# Patient Record
Sex: FEMALE | Race: WHITE | NOT HISPANIC OR LATINO | Employment: OTHER | URBAN - NONMETROPOLITAN AREA
[De-identification: names, ages, dates, MRNs, and addresses within clinical notes are randomized per-mention and may not be internally consistent; named-entity substitution may affect disease eponyms.]

---

## 2017-01-24 ENCOUNTER — LAB (OUTPATIENT)
Dept: LAB | Facility: OTHER | Age: 62
End: 2017-01-24

## 2017-01-24 ENCOUNTER — TRANSCRIBE ORDERS (OUTPATIENT)
Dept: LAB | Facility: OTHER | Age: 62
End: 2017-01-24

## 2017-01-24 DIAGNOSIS — D05.11 INTRADUCTAL CARCINOMA IN SITU OF RIGHT BREAST: Primary | ICD-10-CM

## 2017-01-24 DIAGNOSIS — D05.11 INTRADUCTAL CARCINOMA IN SITU OF RIGHT BREAST: ICD-10-CM

## 2017-01-24 LAB
ALBUMIN SERPL-MCNC: 4.9 G/DL (ref 3.2–5.5)
ALBUMIN/GLOB SERPL: 2.1 G/DL (ref 1–3)
ALP SERPL-CCNC: 53 U/L (ref 15–121)
ALT SERPL W P-5'-P-CCNC: 29 U/L (ref 10–60)
ANION GAP SERPL CALCULATED.3IONS-SCNC: 12 MMOL/L (ref 5–15)
AST SERPL-CCNC: 26 U/L (ref 10–60)
BASOPHILS # BLD AUTO: 0.04 10*3/MM3 (ref 0–0.2)
BASOPHILS NFR BLD AUTO: 0.5 % (ref 0–2)
BILIRUB SERPL-MCNC: 1.4 MG/DL (ref 0.2–1)
BUN BLD-MCNC: 14 MG/DL (ref 8–25)
BUN/CREAT SERPL: 28 (ref 7–25)
CALCIUM SPEC-SCNC: 10.4 MG/DL (ref 8.4–10.8)
CHLORIDE SERPL-SCNC: 98 MMOL/L (ref 100–112)
CO2 SERPL-SCNC: 24 MMOL/L (ref 20–32)
CREAT BLD-MCNC: 0.5 MG/DL (ref 0.4–1.3)
DEPRECATED RDW RBC AUTO: 41.3 FL (ref 36.4–46.3)
EOSINOPHIL # BLD AUTO: 0.13 10*3/MM3 (ref 0–0.7)
EOSINOPHIL NFR BLD AUTO: 1.8 % (ref 0–7)
ERYTHROCYTE [DISTWIDTH] IN BLOOD BY AUTOMATED COUNT: 13 % (ref 11.5–14.5)
GFR SERPL CREATININE-BSD FRML MDRD: 125 ML/MIN/1.73 (ref 45–104)
GLOBULIN UR ELPH-MCNC: 2.3 GM/DL (ref 2.5–4.6)
GLUCOSE BLD-MCNC: 132 MG/DL (ref 70–100)
HCT VFR BLD AUTO: 40 % (ref 35–45)
HGB BLD-MCNC: 13.8 G/DL (ref 12–15.5)
LYMPHOCYTES # BLD AUTO: 2.84 10*3/MM3 (ref 0.6–4.2)
LYMPHOCYTES NFR BLD AUTO: 38.4 % (ref 10–50)
MCH RBC QN AUTO: 30.9 PG (ref 26.5–34)
MCHC RBC AUTO-ENTMCNC: 34.5 G/DL (ref 31.4–36)
MCV RBC AUTO: 89.7 FL (ref 80–98)
MONOCYTES # BLD AUTO: 0.41 10*3/MM3 (ref 0–0.9)
MONOCYTES NFR BLD AUTO: 5.5 % (ref 0–12)
NEUTROPHILS # BLD AUTO: 3.97 10*3/MM3 (ref 2–8.6)
NEUTROPHILS NFR BLD AUTO: 53.8 % (ref 37–80)
PLATELET # BLD AUTO: 264 10*3/MM3 (ref 150–450)
PMV BLD AUTO: 9.3 FL (ref 8–12)
POTASSIUM BLD-SCNC: 3.8 MMOL/L (ref 3.4–5.4)
PROT SERPL-MCNC: 7.2 G/DL (ref 6.7–8.2)
RBC # BLD AUTO: 4.46 10*6/MM3 (ref 3.77–5.16)
SODIUM BLD-SCNC: 134 MMOL/L (ref 134–146)
WBC NRBC COR # BLD: 7.39 10*3/MM3 (ref 3.2–9.8)

## 2017-01-24 PROCEDURE — 82378 CARCINOEMBRYONIC ANTIGEN: CPT | Performed by: INTERNAL MEDICINE

## 2017-01-24 PROCEDURE — 86300 IMMUNOASSAY TUMOR CA 15-3: CPT | Performed by: INTERNAL MEDICINE

## 2017-01-24 PROCEDURE — 36415 COLL VENOUS BLD VENIPUNCTURE: CPT | Performed by: INTERNAL MEDICINE

## 2017-01-24 PROCEDURE — 85025 COMPLETE CBC W/AUTO DIFF WBC: CPT | Performed by: INTERNAL MEDICINE

## 2017-01-24 PROCEDURE — 80053 COMPREHEN METABOLIC PANEL: CPT | Performed by: INTERNAL MEDICINE

## 2017-01-26 LAB — CANCER AG27-29 SERPL-ACNC: 11 U/ML (ref 0–38.6)

## 2017-01-27 LAB — CEA SERPL-MCNC: 0.6 NG/ML (ref 0–5)

## 2017-06-20 ENCOUNTER — OFFICE VISIT (OUTPATIENT)
Dept: OBSTETRICS AND GYNECOLOGY | Facility: CLINIC | Age: 62
End: 2017-06-20

## 2017-06-20 VITALS
SYSTOLIC BLOOD PRESSURE: 144 MMHG | DIASTOLIC BLOOD PRESSURE: 84 MMHG | BODY MASS INDEX: 30.12 KG/M2 | WEIGHT: 170 LBS | HEIGHT: 63 IN

## 2017-06-20 DIAGNOSIS — Z01.419 WELL FEMALE EXAM WITH ROUTINE GYNECOLOGICAL EXAM: Primary | ICD-10-CM

## 2017-06-20 PROCEDURE — 99396 PREV VISIT EST AGE 40-64: CPT | Performed by: OBSTETRICS & GYNECOLOGY

## 2017-06-21 NOTE — PROGRESS NOTES
Subjective     Chief Complaint   Patient presents with   • Gynecologic Exam     annual       Ryann Tracee Stephen is a 61 y.o.  presents today for an annual exam.  Reports going through menopause many years ago.  No bleeding or discharge since.  No pelvic pain.  Recently had port removed and has been in remission from breast cancer since .  Still followed by oncologist in [place].      Last pap was in  (NIL with neg HRHPV); denies history of abnl paps or STDs.  Last mammogram last year and DEXA scan 2 years ago.      Past Medical History:   Diagnosis Date   • Drug therapy    • Essential hypertension     Since    • History of colonoscopy      Hemorrhoids found. 2013       • History of mammogram 2016    VIVIAN BALDWIN ([facility])   • Hx of radiation therapy    • Hypercholesterolemia    • Malignant neoplasm of breast    • Screening for osteoporosis 2013   • Visit for gynecologic examination      Past Surgical History:   Procedure Laterality Date   • BREAST BIOPSY     • BREAST LUMPECTOMY Left    •  SECTION      primary low transverse  section for cephalopelvic disproportion;  Last Performed:    • CHOLECYSTECTOMY     • TUBAL ABDOMINAL LIGATION      shantell laparoscopic bilateral tubal fulguration, undesired fertility;  Last Performed: 10/20/1994   • WISDOM TOOTH EXTRACTION       wisdom teeth extraction x 4     Social History     Social History   • Marital status:      Spouse name: N/A   • Number of children: N/A   • Years of education: N/A     Occupational History   • Not on file.     Social History Main Topics   • Smoking status: Never Smoker   • Smokeless tobacco: Never Used   • Alcohol use No   • Drug use: Not on file   • Sexual activity: Not on file     Other Topics Concern   • Not on file     Social History Narrative     Family History   Problem Relation Age of Onset   • Coronary artery disease Father    • Diabetes Father    • Stroke Father    •  "Hypertension Mother    • Pancreatic cancer Mother        Review of Systems - comprehensive ROS preformed and all negative.     Objective      /84  Ht 63\" (160 cm)  Wt 170 lb (77.1 kg)  LMP  (LMP Unknown)  BMI 30.11 kg/m2    Physical Exam  General:   Appears stated age, AAOx3, NAD   Heart: RRR no m/r/g   Lungs: CTAB   Breast: Left breast is smaller than right breast, radiation changes noted on left breast being that tissue is more dense than right breast.  No masses or lumps noted bilaterally. No nipple discharge.    Neck: No neck fullness, thyroid normal with no nodules noted   Abdomen: Soft, nttp, no masses palpated   Pelvis: External genitalia - NEFG  Urethra - normal appearing, no urethra caruncle or prolapse  Vagina - moderate amount of vaginal atrophy, no discharge or blood noted on glove at end of bimanual exam.   Cervix - No CMT  Uterus - 6 week sized small uterus   Adenxa - no adnexal fullness or masses noted  Anus - normal appearing, no hemorroids   Extremities: No CT or edema bilaterally    Neurologic: CN II - XII grossly intact       Assessment/Plan     ASSESSMENT  1. Well female exam with routine gynecological exam        PLAN  1. Well woman exam  - No pap today given last pap in 2014 NIL with negative co-testing  - Reviewed self breast exams, mammogram today  - DEXA scan today  - Colonoscopy up to date per patient  - RTC in 1 year for annual exam.     Orders Placed This Encounter   Procedures   • DEXA Bone Density Axial   • Mammo screening digital tomosynthesis bilateral w REYNALDO MADRIGAL FriMD sammi  6/20/2017           "

## 2017-07-03 RX ORDER — ATORVASTATIN CALCIUM 80 MG/1
TABLET, FILM COATED ORAL
Qty: 90 TABLET | Refills: 3 | Status: SHIPPED | OUTPATIENT
Start: 2017-07-03

## 2017-07-28 ENCOUNTER — TRANSCRIBE ORDERS (OUTPATIENT)
Dept: LAB | Facility: OTHER | Age: 62
End: 2017-07-28

## 2017-07-28 DIAGNOSIS — D05.90 CARCINOMA IN SITU OF BREAST, UNSPECIFIED LATERALITY: Primary | ICD-10-CM

## 2018-01-03 RX ORDER — ALENDRONATE SODIUM 70 MG/1
70 TABLET ORAL
Qty: 12 TABLET | Refills: 12 | Status: SHIPPED | OUTPATIENT
Start: 2018-01-03 | End: 2020-11-18 | Stop reason: ALTCHOICE

## 2018-06-22 DIAGNOSIS — Z12.31 ENCOUNTER FOR SCREENING MAMMOGRAM FOR MALIGNANT NEOPLASM OF BREAST: Primary | ICD-10-CM

## 2018-07-02 ENCOUNTER — TELEPHONE (OUTPATIENT)
Dept: ONCOLOGY | Facility: HOSPITAL | Age: 63
End: 2018-07-02

## 2018-07-06 ENCOUNTER — OFFICE VISIT (OUTPATIENT)
Dept: SURGERY | Facility: CLINIC | Age: 63
End: 2018-07-06

## 2018-07-06 VITALS
HEIGHT: 63 IN | SYSTOLIC BLOOD PRESSURE: 138 MMHG | BODY MASS INDEX: 28.17 KG/M2 | WEIGHT: 159 LBS | DIASTOLIC BLOOD PRESSURE: 80 MMHG

## 2018-07-06 DIAGNOSIS — R92.8 ABNORMAL MAMMOGRAM: Primary | ICD-10-CM

## 2018-07-06 PROCEDURE — 99204 OFFICE O/P NEW MOD 45 MIN: CPT | Performed by: SURGERY

## 2018-07-06 RX ORDER — FENOFIBRATE 54 MG/1
TABLET ORAL
COMMUNITY
Start: 2018-03-12 | End: 2019-03-12 | Stop reason: DRUGHIGH

## 2018-07-06 NOTE — PROGRESS NOTES
62-year-old female referred here after she underwent a screening mammogram and subsequent diagnostic mammogram and ultrasound of left breast demonstrated to concerning areas on the medial aspect of her left breast.  One is at 9:00 and the other was at 9:30 position in the appear to be separate hypoechoic concerning lesions by ultrasound.  One lesion he can see on mammogram.  Radiology read these as BI-RADS 5.  Patient's had breast cancer in her left breast and she's had breast conserving therapy with her surgery done over in [place] a few years ago.  She received chemotherapy and hormonal therapy as well.  Denies any complaints related to either breast.  No family history of breast cancer.  I have reviewed the mammograms and agree with radiology that these 2 lesions should be biopsied and are concerning.    Allergies: No Known Allergies      Home Medications:  Prior to Admission medications    Medication Sig Start Date End Date Taking? Authorizing Provider   alendronate (FOSAMAX) 70 MG tablet Take 1 tablet by mouth Every 7 (Seven) Days. 1/3/18  Yes Spring HERRON Friday, MD   atorvastatin (LIPITOR) 80 MG tablet TAKE 1 TABLET DAILY 7/3/17  Yes Ector Rodriguez MD   fenofibrate (TRICOR) 54 MG tablet TAKE 1 TABLET DAILY 3/12/18  Yes Historical Provider, MD   hydrochlorothiazide (HYDRODIURIL) 25 MG tablet Take 25 mg by mouth daily.   Yes Historical Provider, MD   lisinopril (PRINIVIL,ZESTRIL) 20 MG tablet TAKE 1 TABLET DAILY 8/2/16  Yes Ector Rodriguez MD   metFORMIN (GLUCOPHAGE) 1000 MG tablet Take 1,000 mg by mouth. 12/20/17 12/21/18 Yes Historical Provider, MD       Social History     Social History   • Marital status:      Spouse name: N/A   • Number of children: N/A   • Years of education: N/A     Occupational History   • Not on file.     Social History Main Topics   • Smoking status: Never Smoker   • Smokeless tobacco: Never Used   • Alcohol use No   • Drug use: No   • Sexual activity: Not on file     Other  Topics Concern   • Not on file     Social History Narrative   • No narrative on file       Past Medical History:   Diagnosis Date   • Drug therapy    • Essential hypertension     Since    • History of colonoscopy      Hemorrhoids found. 2013       • History of mammogram 2016    VIVIAN BALDWIN ([facility])   • Hx of radiation therapy    • Hypercholesterolemia    • Malignant neoplasm of breast (CMS/HCC)    • Screening for osteoporosis 2013   • Visit for gynecologic examination        Family History   Problem Relation Age of Onset   • Coronary artery disease Father    • Diabetes Father    • Stroke Father    • Hypertension Mother    • Pancreatic cancer Mother        Past Surgical History:   Procedure Laterality Date   • BREAST BIOPSY     • BREAST LUMPECTOMY Left    •  SECTION      primary low transverse  section for cephalopelvic disproportion;  Last Performed:    • CHOLECYSTECTOMY     • TUBAL ABDOMINAL LIGATION      shantell laparoscopic bilateral tubal fulguration, undesired fertility;  Last Performed: 10/20/1994   • WISDOM TOOTH EXTRACTION       wisdom teeth extraction x 4     Ros  Denies chest pain   denies shortness of breath  Denies any bleeding issue  Does not take blood thinners      Physical exam  Alert and appropriate  Nontoxic appearing  Neck soft without masses  Lungs clear  Heart regular rate and rhythm  Breasts symmetrical without any palpable masses nipple discharge or adenopathy  Skin warm and dry  Abdomen soft      Assessment and plan  2 concerning lesions in left breast with a history of left breast cancer.  Recommend ultrasound guided mammotome biopsy of both these lesions and leaving a clip.  I fully discussed the procedure alternatives risk and benefits with the patient and her  and they understand and wish to proceed

## 2018-07-06 NOTE — PATIENT INSTRUCTIONS

## 2018-07-09 ENCOUNTER — HOSPITAL ENCOUNTER (OUTPATIENT)
Dept: ULTRASOUND IMAGING | Facility: HOSPITAL | Age: 63
Discharge: HOME | End: 2018-07-09
Admitting: SURGERY

## 2018-07-09 ENCOUNTER — HOSPITAL ENCOUNTER (OUTPATIENT)
Dept: ULTRASOUND IMAGING | Facility: HOSPITAL | Age: 63
Discharge: HOME | End: 2018-07-09

## 2018-07-09 VITALS
WEIGHT: 158.51 LBS | HEART RATE: 75 BPM | BODY MASS INDEX: 28.09 KG/M2 | RESPIRATION RATE: 18 BRPM | DIASTOLIC BLOOD PRESSURE: 71 MMHG | SYSTOLIC BLOOD PRESSURE: 148 MMHG | TEMPERATURE: 97.2 F | OXYGEN SATURATION: 97 % | HEIGHT: 63 IN

## 2018-07-09 VITALS
HEART RATE: 72 BPM | SYSTOLIC BLOOD PRESSURE: 118 MMHG | TEMPERATURE: 97.2 F | DIASTOLIC BLOOD PRESSURE: 56 MMHG | RESPIRATION RATE: 18 BRPM | OXYGEN SATURATION: 95 %

## 2018-07-09 DIAGNOSIS — R92.8 ABNORMAL MAMMOGRAM: ICD-10-CM

## 2018-07-09 PROCEDURE — 19084 BX BREAST ADD LESION US IMAG: CPT | Performed by: SURGERY

## 2018-07-09 PROCEDURE — 88341 IMHCHEM/IMCYTCHM EA ADD ANTB: CPT | Performed by: PATHOLOGY

## 2018-07-09 PROCEDURE — 88305 TISSUE EXAM BY PATHOLOGIST: CPT | Performed by: PATHOLOGY

## 2018-07-09 PROCEDURE — 88305 TISSUE EXAM BY PATHOLOGIST: CPT | Performed by: SURGERY

## 2018-07-09 PROCEDURE — 88342 IMHCHEM/IMCYTCHM 1ST ANTB: CPT | Performed by: SURGERY

## 2018-07-09 PROCEDURE — A4648 IMPLANTABLE TISSUE MARKER: HCPCS

## 2018-07-09 PROCEDURE — 19083 BX BREAST 1ST LESION US IMAG: CPT | Performed by: SURGERY

## 2018-07-09 PROCEDURE — 88341 IMHCHEM/IMCYTCHM EA ADD ANTB: CPT | Performed by: SURGERY

## 2018-07-09 PROCEDURE — 88342 IMHCHEM/IMCYTCHM 1ST ANTB: CPT | Performed by: PATHOLOGY

## 2018-07-09 RX ORDER — LIDOCAINE HYDROCHLORIDE AND EPINEPHRINE 10; 10 MG/ML; UG/ML
20 INJECTION, SOLUTION INFILTRATION; PERINEURAL ONCE
Status: COMPLETED | OUTPATIENT
Start: 2018-07-09 | End: 2018-07-09

## 2018-07-09 RX ORDER — IBUPROFEN 600 MG/1
600 TABLET ORAL EVERY 6 HOURS PRN
Status: DISCONTINUED | OUTPATIENT
Start: 2018-07-09 | End: 2018-07-10 | Stop reason: HOSPADM

## 2018-07-09 RX ORDER — DIAZEPAM 5 MG/1
5 TABLET ORAL ONCE
Status: COMPLETED | OUTPATIENT
Start: 2018-07-09 | End: 2018-07-09

## 2018-07-09 RX ORDER — HYDROCODONE BITARTRATE AND ACETAMINOPHEN 5; 325 MG/1; MG/1
1 TABLET ORAL ONCE AS NEEDED
Status: DISCONTINUED | OUTPATIENT
Start: 2018-07-09 | End: 2018-07-10 | Stop reason: HOSPADM

## 2018-07-09 RX ORDER — SODIUM CHLORIDE 9 MG/ML
50 INJECTION, SOLUTION INTRAVENOUS CONTINUOUS
Status: DISCONTINUED | OUTPATIENT
Start: 2018-07-09 | End: 2018-07-10 | Stop reason: HOSPADM

## 2018-07-09 RX ADMIN — DIAZEPAM 5 MG: 5 TABLET ORAL at 09:21

## 2018-07-09 RX ADMIN — LIDOCAINE HYDROCHLORIDE,EPINEPHRINE BITARTRATE 20 ML: 10; .01 INJECTION, SOLUTION INFILTRATION; PERINEURAL at 10:10

## 2018-07-09 RX ADMIN — SODIUM CHLORIDE 50 ML/HR: 900 INJECTION, SOLUTION INTRAVENOUS at 10:10

## 2018-07-09 NOTE — DISCHARGE INSTRUCTIONS
Care after Local Anesthesia    You have remained awake during your surgery. The medicine you received was injected directly into the surgery site. The medicine numbed the area so you didn't feel any pain during surgery. Depending on the medicine used, you may feel comfortable for several hours.    Activity:    1) Use caution to protect your extremity from injury until the numbness is gone. You may return to your normal home activities as directed by your health care provider.    2) You are at an increased risk for falling related to the anesthesia and/or sedation during surgery and pain medication you will take at home. You will need assistance with ambulation until the feelings in your extremity returns to normal. Utilize assist devices (example: crutches, walker, cane or a care provider/family member as needed).    3) You must not drive a car or operate equipment for at least 24 hours unless exempted by the attending physician. If you are dizzy for longer than 24 hours, notify your physician.    Medicine/Discomfort:    You can expect some discomfort when the local anesthetic wears off, if your health care provider ordered pain medicine, take it as prescribed.    Diet:    You may resume your normal diet. Do not drink alcoholic beverages for at least 24 hours following anesthesia and/or sedation.    When to call your health care provider:    Call your health care provider if you have any questions or concerns.

## 2018-07-09 NOTE — H&P (VIEW-ONLY)
62-year-old female referred here after she underwent a screening mammogram and subsequent diagnostic mammogram and ultrasound of left breast demonstrated to concerning areas on the medial aspect of her left breast.  One is at 9:00 and the other was at 9:30 position in the appear to be separate hypoechoic concerning lesions by ultrasound.  One lesion he can see on mammogram.  Radiology read these as BI-RADS 5.  Patient's had breast cancer in her left breast and she's had breast conserving therapy with her surgery done over in [place] a few years ago.  She received chemotherapy and hormonal therapy as well.  Denies any complaints related to either breast.  No family history of breast cancer.  I have reviewed the mammograms and agree with radiology that these 2 lesions should be biopsied and are concerning.    Allergies: No Known Allergies      Home Medications:  Prior to Admission medications    Medication Sig Start Date End Date Taking? Authorizing Provider   alendronate (FOSAMAX) 70 MG tablet Take 1 tablet by mouth Every 7 (Seven) Days. 1/3/18  Yes Spring HERRON Friday, MD   atorvastatin (LIPITOR) 80 MG tablet TAKE 1 TABLET DAILY 7/3/17  Yes Ector Rodriguez MD   fenofibrate (TRICOR) 54 MG tablet TAKE 1 TABLET DAILY 3/12/18  Yes Historical Provider, MD   hydrochlorothiazide (HYDRODIURIL) 25 MG tablet Take 25 mg by mouth daily.   Yes Historical Provider, MD   lisinopril (PRINIVIL,ZESTRIL) 20 MG tablet TAKE 1 TABLET DAILY 8/2/16  Yes Ector Rodriguez MD   metFORMIN (GLUCOPHAGE) 1000 MG tablet Take 1,000 mg by mouth. 12/20/17 12/21/18 Yes Historical Provider, MD       Social History     Social History   • Marital status:      Spouse name: N/A   • Number of children: N/A   • Years of education: N/A     Occupational History   • Not on file.     Social History Main Topics   • Smoking status: Never Smoker   • Smokeless tobacco: Never Used   • Alcohol use No   • Drug use: No   • Sexual activity: Not on file     Other  Topics Concern   • Not on file     Social History Narrative   • No narrative on file       Past Medical History:   Diagnosis Date   • Drug therapy    • Essential hypertension     Since    • History of colonoscopy      Hemorrhoids found. 2013       • History of mammogram 2016    VIVIAN BALDWIN ([facility])   • Hx of radiation therapy    • Hypercholesterolemia    • Malignant neoplasm of breast (CMS/HCC)    • Screening for osteoporosis 2013   • Visit for gynecologic examination        Family History   Problem Relation Age of Onset   • Coronary artery disease Father    • Diabetes Father    • Stroke Father    • Hypertension Mother    • Pancreatic cancer Mother        Past Surgical History:   Procedure Laterality Date   • BREAST BIOPSY     • BREAST LUMPECTOMY Left    •  SECTION      primary low transverse  section for cephalopelvic disproportion;  Last Performed:    • CHOLECYSTECTOMY     • TUBAL ABDOMINAL LIGATION      shantell laparoscopic bilateral tubal fulguration, undesired fertility;  Last Performed: 10/20/1994   • WISDOM TOOTH EXTRACTION       wisdom teeth extraction x 4     Ros  Denies chest pain   denies shortness of breath  Denies any bleeding issue  Does not take blood thinners      Physical exam  Alert and appropriate  Nontoxic appearing  Neck soft without masses  Lungs clear  Heart regular rate and rhythm  Breasts symmetrical without any palpable masses nipple discharge or adenopathy  Skin warm and dry  Abdomen soft      Assessment and plan  2 concerning lesions in left breast with a history of left breast cancer.  Recommend ultrasound guided mammotome biopsy of both these lesions and leaving a clip.  I fully discussed the procedure alternatives risk and benefits with the patient and her  and they understand and wish to proceed

## 2018-07-11 ENCOUNTER — OFFICE VISIT (OUTPATIENT)
Dept: SURGERY | Facility: CLINIC | Age: 63
End: 2018-07-11

## 2018-07-11 VITALS
SYSTOLIC BLOOD PRESSURE: 188 MMHG | DIASTOLIC BLOOD PRESSURE: 80 MMHG | BODY MASS INDEX: 28.53 KG/M2 | WEIGHT: 161 LBS | HEIGHT: 63 IN

## 2018-07-11 DIAGNOSIS — M79.89 FAT NECROSIS: Primary | ICD-10-CM

## 2018-07-11 LAB
LAB AP CASE REPORT: NORMAL
LAB AP SPECIAL STAINS: NORMAL
PATH REPORT.FINAL DX SPEC: NORMAL
PATH REPORT.GROSS SPEC: NORMAL

## 2018-07-11 PROCEDURE — 99212 OFFICE O/P EST SF 10 MIN: CPT | Performed by: SURGERY

## 2018-07-11 NOTE — PATIENT INSTRUCTIONS

## 2018-07-11 NOTE — PROGRESS NOTES
62-year-old female returns to days after left breast ultrasound guided mammotome biopsy of 2 felt to be separate lesions in left breast.  I spoke with Dr. holden and he feels both lesions are fat necrosis.  Patient did well with the procedure and has no complaints.  I went over the pathology with both patient and her .  I recommend she get a mammogram of the left breast in 3 months return to clinic after the study or sooner if she has any other concerns or questions.

## 2018-07-18 DIAGNOSIS — R92.8 ABNORMAL MAMMOGRAM OF LEFT BREAST: Primary | ICD-10-CM

## 2019-03-12 ENCOUNTER — PROCEDURE VISIT (OUTPATIENT)
Dept: OBSTETRICS AND GYNECOLOGY | Facility: CLINIC | Age: 64
End: 2019-03-12

## 2019-03-12 VITALS
DIASTOLIC BLOOD PRESSURE: 75 MMHG | BODY MASS INDEX: 28.35 KG/M2 | HEIGHT: 63 IN | SYSTOLIC BLOOD PRESSURE: 142 MMHG | WEIGHT: 160 LBS

## 2019-03-12 DIAGNOSIS — Z01.419 ENCOUNTER FOR WELL WOMAN EXAM WITH ROUTINE GYNECOLOGICAL EXAM: Primary | ICD-10-CM

## 2019-03-12 DIAGNOSIS — Z12.31 ENCOUNTER FOR SCREENING MAMMOGRAM FOR BREAST CANCER: ICD-10-CM

## 2019-03-12 PROCEDURE — 99396 PREV VISIT EST AGE 40-64: CPT | Performed by: NURSE PRACTITIONER

## 2019-03-12 PROCEDURE — 87624 HPV HI-RISK TYP POOLED RSLT: CPT | Performed by: NURSE PRACTITIONER

## 2019-03-12 PROCEDURE — G0123 SCREEN CERV/VAG THIN LAYER: HCPCS | Performed by: NURSE PRACTITIONER

## 2019-03-12 RX ORDER — SPIRONOLACTONE 50 MG/1
1 TABLET, FILM COATED ORAL 2 TIMES DAILY
COMMUNITY
Start: 2018-12-26

## 2019-03-12 RX ORDER — FENOFIBRATE 160 MG/1
160 TABLET ORAL
COMMUNITY
Start: 2019-03-05 | End: 2019-10-16

## 2019-03-12 NOTE — PROGRESS NOTES
Subjective   Ryann Tracee Stephen is a 63 y.o. Here for GYN exam. No complaints or concerns at this time.    LMP- ; denies any PMB.     Hx of BRCA with left lumpectomy, chemotherapy and radiation. Had an abnormal screening mammogram of the left breast in 2018 but the biopsy was negative and her f/u in October was normal.       Gynecologic Exam   The patient's pertinent negatives include no genital itching, genital lesions, genital odor, genital rash, missed menses, pelvic pain, vaginal bleeding or vaginal discharge. Pertinent negatives include no abdominal pain, constipation, diarrhea or dysuria. She is sexually active. No, her partner does not have an STD. She uses tubal ligation for contraception. She is postmenopausal. Her past medical history is significant for an abdominal surgery and a gynecological surgery. There is no history of a  section, an ectopic pregnancy, endometriosis, herpes simplex, menorrhagia, metrorrhagia, miscarriage, ovarian cysts, perineal abscess, PID, an STD, a terminated pregnancy or vaginosis.       The following portions of the patient's history were reviewed and updated as appropriate: allergies, current medications, past family history, past medical history, past social history, past surgical history and problem list.    Review of Systems   Constitutional: Negative for activity change, appetite change, diaphoresis, fatigue, unexpected weight gain and unexpected weight loss.   Respiratory: Negative for chest tightness and shortness of breath.    Cardiovascular: Negative for chest pain and palpitations.   Gastrointestinal: Negative for abdominal distention, abdominal pain, constipation and diarrhea.   Genitourinary: Negative for breast discharge, decreased libido, dyspareunia, dysuria, menorrhagia, missed menses, pelvic pain, vaginal bleeding, vaginal discharge, vaginal pain, breast lump and breast pain.   Musculoskeletal: Negative for myalgias.   Skin: Negative for  color change, dry skin and skin lesions.   Neurological: Negative for light-headedness and headache.   Psychiatric/Behavioral: Negative for agitation, dysphoric mood, sleep disturbance, depressed mood and stress. The patient is not nervous/anxious.        Objective   Physical Exam   Constitutional: She is oriented to person, place, and time. She appears well-developed and well-nourished.   Neck: No thyromegaly present.   Cardiovascular: Normal rate, regular rhythm, normal heart sounds and intact distal pulses.   Pulmonary/Chest: Effort normal and breath sounds normal. Right breast exhibits no inverted nipple, no mass, no nipple discharge, no skin change and no tenderness. Left breast exhibits no inverted nipple, no mass, no nipple discharge, no skin change and no tenderness. Breasts are symmetrical.   Abdominal: Soft. Bowel sounds are normal. She exhibits no distension. There is no tenderness.   Genitourinary: Vagina normal and uterus normal. No breast discharge or bleeding. There is no rash, tenderness, lesion or injury on the right labia. There is no rash, tenderness, lesion or injury on the left labia. Cervix exhibits no motion tenderness, no discharge and no friability. Right adnexum displays no mass, no tenderness and no fullness. Left adnexum displays no mass, no tenderness and no fullness.   Genitourinary Comments: Vaginal atrophy noted. Pap smear obtained.    Lymphadenopathy:     She has no axillary adenopathy.        Right: No inguinal adenopathy present.        Left: No inguinal adenopathy present.   Neurological: She is alert and oriented to person, place, and time.   Skin: Skin is warm, dry and intact.   Psychiatric: She has a normal mood and affect. Her speech is normal and behavior is normal.   Nursing note and vitals reviewed.        Assessment/Plan   Ryann was seen today for gynecologic exam.    Diagnoses and all orders for this visit:    Encounter for well woman exam with routine gynecological  exam  -     Liquid-based Pap Smear, Screening    Encounter for screening mammogram for breast cancer  -     Mammo Screening Digital Tomosynthesis Bilateral With CAD          RTC in 1-2 years for GYN exam or sooner, PRN.

## 2019-03-14 LAB
GEN CATEG CVX/VAG CYTO-IMP: NORMAL
LAB AP CASE REPORT: NORMAL
LAB AP GYN ADDITIONAL INFORMATION: NORMAL
PATH INTERP SPEC-IMP: NORMAL
STAT OF ADQ CVX/VAG CYTO-IMP: NORMAL

## 2019-03-19 LAB — HPV I/H RISK 4 DNA CVX QL PROBE+SIG AMP: NEGATIVE

## 2019-06-18 DIAGNOSIS — Z13.820 OSTEOPOROSIS SCREENING: Primary | ICD-10-CM

## 2019-06-18 DIAGNOSIS — Z78.0 POSTMENOPAUSAL: ICD-10-CM

## 2019-09-04 DIAGNOSIS — Z17.0 MALIGNANT NEOPLASM OF OVERLAPPING SITES OF LEFT BREAST IN FEMALE, ESTROGEN RECEPTOR POSITIVE (HCC): Primary | ICD-10-CM

## 2019-09-04 DIAGNOSIS — C50.812 MALIGNANT NEOPLASM OF OVERLAPPING SITES OF LEFT BREAST IN FEMALE, ESTROGEN RECEPTOR POSITIVE (HCC): Primary | ICD-10-CM

## 2019-10-16 ENCOUNTER — OFFICE VISIT (OUTPATIENT)
Dept: ONCOLOGY | Facility: CLINIC | Age: 64
End: 2019-10-16

## 2019-10-16 VITALS
HEART RATE: 72 BPM | DIASTOLIC BLOOD PRESSURE: 72 MMHG | SYSTOLIC BLOOD PRESSURE: 136 MMHG | RESPIRATION RATE: 16 BRPM | BODY MASS INDEX: 28.14 KG/M2 | HEIGHT: 63 IN | OXYGEN SATURATION: 99 % | TEMPERATURE: 97.3 F | WEIGHT: 158.8 LBS

## 2019-10-16 DIAGNOSIS — I10 ESSENTIAL HYPERTENSION: ICD-10-CM

## 2019-10-16 DIAGNOSIS — C50.212 MALIGNANT NEOPLASM OF UPPER-INNER QUADRANT OF LEFT BREAST IN FEMALE, ESTROGEN RECEPTOR POSITIVE (HCC): Primary | ICD-10-CM

## 2019-10-16 DIAGNOSIS — M81.8 OTHER OSTEOPOROSIS, UNSPECIFIED PATHOLOGICAL FRACTURE PRESENCE: ICD-10-CM

## 2019-10-16 DIAGNOSIS — E78.00 HYPERCHOLESTEROLEMIA: ICD-10-CM

## 2019-10-16 DIAGNOSIS — Z17.0 MALIGNANT NEOPLASM OF UPPER-INNER QUADRANT OF LEFT BREAST IN FEMALE, ESTROGEN RECEPTOR POSITIVE (HCC): Primary | ICD-10-CM

## 2019-10-16 PROCEDURE — 99214 OFFICE O/P EST MOD 30 MIN: CPT | Performed by: NURSE PRACTITIONER

## 2019-10-16 RX ORDER — PHENOL 1.4 %
600 AEROSOL, SPRAY (ML) MUCOUS MEMBRANE DAILY
COMMUNITY

## 2019-10-16 RX ORDER — LOSARTAN POTASSIUM 50 MG/1
50 TABLET ORAL DAILY
COMMUNITY

## 2019-10-17 ENCOUNTER — RESULTS ENCOUNTER (OUTPATIENT)
Dept: ONCOLOGY | Facility: CLINIC | Age: 64
End: 2019-10-17

## 2019-10-17 DIAGNOSIS — C50.212 MALIGNANT NEOPLASM OF UPPER-INNER QUADRANT OF LEFT BREAST IN FEMALE, ESTROGEN RECEPTOR POSITIVE (HCC): ICD-10-CM

## 2019-10-17 DIAGNOSIS — Z17.0 MALIGNANT NEOPLASM OF UPPER-INNER QUADRANT OF LEFT BREAST IN FEMALE, ESTROGEN RECEPTOR POSITIVE (HCC): ICD-10-CM

## 2019-10-17 NOTE — PROGRESS NOTES
[facility]  HEMATOLOGY & ONCOLOGY    [facility] ONC [facility] HEMATOLOGY AND ONCOLOGY  [address]  [facility] [address] [phone]    Patient Name: Ryann Stephen  Encounter Date: 10/16/2019  YOB: 1955  Patient Number: [id number]    Subjective     VISIT DIAGNOSIS:   Encounter Diagnoses   Name Primary?   • Malignant neoplasm of upper-inner quadrant of left breast in female, estrogen receptor positive (CMS/HCC) Yes   • Other osteoporosis, unspecified pathological fracture presence    • Essential hypertension    • Hypercholesterolemia        REASON FOR VISIT:     Chief Complaint   Patient presents with   • Breast Cancer     Here for yearly f/u. Had mammo and bone density in July. Not having any c/o today.        Problem List Items Addressed This Visit        Cardiovascular and Mediastinum    Essential hypertension    Relevant Medications    losartan (COZAAR) 50 MG tablet    Hypercholesterolemia       Musculoskeletal and Integument    Osteoporosis       Other    Malignant neoplasm of upper-inner quadrant of left breast in female, estrogen receptor positive (CMS/HCC) - Primary    Relevant Orders    CEA    CA 27.29 (Serial Monitor)    CA 27.29 (Serial Monitor)    CEA    CBC & Differential    Comprehensive Metabolic Panel          Cancer Staging Information:  Cancer Staging  Malignant neoplasm of upper-inner quadrant of left breast in female, estrogen receptor positive (CMS/HCC)  Staging form: Breast, AJCC V7  - Pathologic stage from 10/16/2019: Stage IB (T1b, N1mi, cM0) - Signed by Justine Shaver APRN on 10/16/2019      Oncology/Hematology History    DIAGNOSTIC ABNORMALITIES:  Mammography on 12/28/2010 revealed fibroglandular densities in the breast parenchyma, numerous benign-appearing calcifications in each breast, and a questionable area of increasing nodular density in the outer middle third of the left breast.  This area was associated with microcalcification and was an indeterminate finding.  Further evaluation was advised.  Cone-down spot compression views on 01/11/2011 revealed a spiculated lesion in the left breast strongly suspicious for carcinoma.  Biopsy was advised.  Stereotactic mammotome biopsy at [facility] in [place], [place] on 01/14/2011 showed moderately differentiated invasive ductal carcinoma with ductal carcinoma in situ. The findings are compatible with a primary breast cancer. The malignancy was characterized further as 99% ER positive, 60% ID positive, and HER-2/robert of +1.  Left breast needle localization biopsy (excisional) and node accession, 02/14/2011. She was found to have residual infiltrating ductal carcinoma, high grade. She was found to have a 1 x 0.7 x 0.7 cm high grade invasive ductal carcinoma, Frankfort score = 8. Also noted were calcifications and benign breast elements, healing prior biopsy site, and no satellite lesions. One of the two accessed lymph nodes was positive with the largest deposit measuring 0.75 mm. Extranodal extension was identified. The tumor was staged as an AJCC pT1b, N1mic, MX, G3.  Further staging studies at [facility] on 03/08/2011 included a negative chest CT, brain CT, and nuclear bone scan.  The multinodular goiter is noted.  PREVIOUS INTERVENTIONS:  Left breast needle localization biopsy (excisional) and node accession, 02/14/2011.  T/C (Taxotere and Cytoxan).  From 03/10/2011 through 05/11/2011. Four cycles completed.  Adjuvant Arimidex. From 05/30/2011 through 05/30/2016  XRT to the left breast. From 06/21/2011 through 08/01/2011.          Malignant neoplasm of upper-inner quadrant of left breast in female, estrogen receptor positive (CMS/HCC)       INTERVAL HISTORY  Patient ID: Ryann Stephen is a 64 y.o. year old female who is seen in follow-up for carcinoma of the left breast.  She was diagnosed with a stage Ib  left breast cancer in .  She underwent lumpectomy in 2011 followed by adjuvant chemotherapy, adjuvant radiation therapy to the breast and also completed 5 years of adjuvant hormonal manipulation with Arimidex.  She completed the hormonal manipulation in May 2016.  The patient is here alone today.  History is obtained from the patient who is considered to be a reliable historian.    She presents today feeling well.  She has no complaints.  She has no pain.  No abdominal pain, nausea, vomiting nor diarrhea.  She had no fever chills or night sweats and no weight changes.  She denies any bowel or bladder habit changes.      She continues to follow with her primary care provider Dr. Pastrana.  She is up-to-date on her bone mineral density study as it was just completed on 2019.  Her mammogram was also 2019 and unremarkable.  She continues on Fosamax.  Her last colonoscopy was  and unremarkable.    Lab work on 10/9/2019 shows a white count of 7.8, hemoglobin 13.9, hematocrit 42.1 and a platelet count of 346,000.  Her CMP was normal except for a minor elevation in glucose at 131.  She was not fasting.    Past Medical History:   Past Medical History:   Diagnosis Date   • Drug therapy    • Essential hypertension     Since    • History of colonoscopy      Hemorrhoids found. 2013       • History of mammogram 2016    VIVIAN BALDWIN ([facility])   • Hx of radiation therapy    • Hypercholesterolemia    • Malignant neoplasm of breast (CMS/HCC)    • Screening for osteoporosis 2013   • Visit for gynecologic examination      Past Surgical History:   Past Surgical History:   Procedure Laterality Date   • BREAST BIOPSY     • BREAST LUMPECTOMY Left    •  SECTION      primary low transverse  section for cephalopelvic disproportion;  Last Performed:    • CHOLECYSTECTOMY     • TUBAL ABDOMINAL LIGATION      shantell laparoscopic bilateral tubal fulguration, undesired  fertility;  Last Performed: 10/20/1994   • WISDOM TOOTH EXTRACTION       wisdom teeth extraction x 4     Social History:   Social History     Socioeconomic History   • Marital status:      Spouse name: Not on file   • Number of children: Not on file   • Years of education: Not on file   • Highest education level: Not on file   Tobacco Use   • Smoking status: Never Smoker   • Smokeless tobacco: Never Used   Substance and Sexual Activity   • Alcohol use: No   • Drug use: No   • Sexual activity: Defer     Family History:   Family History   Problem Relation Age of Onset   • Coronary artery disease Father    • Stroke Father    • Hypertension Mother    • Pancreatic cancer Mother    • Diabetes Mother    • Breast cancer Maternal Aunt    • Cancer Maternal Grandmother         female   • No Known Problems Maternal Grandfather    • Liver cancer Paternal Grandmother    • No Known Problems Paternal Grandfather        Review of Systems   Constitutional: Negative for activity change, appetite change, chills, diaphoresis, fatigue, fever and unexpected weight change.   HENT: Negative for congestion, facial swelling, mouth sores, nosebleeds, sore throat, trouble swallowing and voice change.    Eyes: Negative for discharge and redness.   Respiratory: Negative for cough, chest tightness, shortness of breath and wheezing.    Cardiovascular: Negative for chest pain, palpitations and leg swelling.   Gastrointestinal: Negative for abdominal distention, abdominal pain, blood in stool, constipation, diarrhea, nausea and vomiting.   Endocrine: Negative.    Genitourinary: Negative for difficulty urinating, dysuria, frequency, hematuria and urgency.   Musculoskeletal: Negative for arthralgias, gait problem and myalgias.   Skin: Negative for color change and rash.   Neurological: Negative for dizziness, weakness, light-headedness, numbness and headaches.   Hematological: Negative for adenopathy. Does not bruise/bleed easily.  "  Psychiatric/Behavioral: Negative for confusion and sleep disturbance. The patient is not nervous/anxious.         Medications:    Current Outpatient Medications   Medication Sig Dispense Refill   • alendronate (FOSAMAX) 70 MG tablet Take 1 tablet by mouth Every 7 (Seven) Days. 12 tablet 12   • atorvastatin (LIPITOR) 80 MG tablet TAKE 1 TABLET DAILY 90 tablet 3   • calcium carbonate (OS-HO) 600 MG tablet Take 600 mg by mouth Daily.     • Dapagliflozin Propanediol (FARXIGA) 10 MG tablet Take  by mouth.     • Lactobacillus (PROBIOTIC ACIDOPHILUS PO) Take  by mouth.     • losartan (COZAAR) 50 MG tablet Take 50 mg by mouth Daily.     • Misc. Devices (ILLUSIONS C BREAST PROSTHESIS) misc New breast prosthesis for the left breast 1 each 0   • spironolactone (ALDACTONE) 50 MG tablet Take 1 tablet by mouth 2 (Two) Times a Day.       No current facility-administered medications for this visit.        ALLERGIES:  No Known Allergies    Objective      Vitals:    10/16/19 1519   BP: 136/72   Pulse: 72   Resp: 16   Temp: 97.3 °F (36.3 °C)   TempSrc: Temporal   SpO2: 99%   Weight: 72 kg (158 lb 12.8 oz)   Height: 160 cm (63\")   PainSc: 0-No pain     /72   Pulse 72   Temp 97.3 °F (36.3 °C) (Temporal)   Resp 16   Ht 160 cm (63\")   Wt 72 kg (158 lb 12.8 oz)   LMP  (LMP Unknown)   SpO2 99%   Breastfeeding? No   BMI 28.13 kg/m²      No flowsheet data found.    PHYSICAL EXAM:  General Appearance:    Alert, cooperative, well nourished in no distress   Head:    Normocephalic, without obvious abnormality, atraumatic   Eyes:    PERRL, conjunctiva pink, sclera clear, EOM's intact   Ears:    Not examined   Nose:   Nares normal, septum midline, mucosa normal, no drainage    Throat:   Lips, mucosa, and tongue normal;mucous membranes moist   Neck:   Supple, Trachea midline       Lungs:     Clear to auscultation bilaterally, respirations unlabored   Chest Wall:    No tenderness or deformity; Breasts: left breast smaller than right " with surgical changes, no palpable abnormalities in either breast.       Heart:    Regular rate and rhythm, no murmur, rub or gallop   Abdomen:     Soft, non-tender, bowel sounds active all four quadrants,     no masses, no organomegaly   Extremities:   Extremities without cyanosis or edema       Skin:   Skin color, texture, turgor normal, no rashes or lesions   Lymph nodes:   Cervical, supraclavicular, and axillary nodes normal   Neurologic:   Grossly nonfocal, gait coordinated and smooth, cognition is   preserved.     LABS    Lab Results - Last 18 Months   Lab Units 10/09/19  0827 12/28/18  0836 08/23/18  0738   HEMOGLOBIN g/dL 13.9 13.1 12.4*   HEMATOCRIT % 42.1 39.2 37.1*   MCV fL 92 91 93   WBC 10*3/uL 7.8 5.3 5.9   RDW fL 42.3 40.5 42.2   MPV fL 9.8 9.5 10.4   PLATELETS 10*3/uL 346 316 289   IMM GRAN % % 57.6 51.6 49.6   NRBC % 0 0 0       Lab Results - Last 18 Months   Lab Units 12/28/18  0836   SODIUM mmol/L 139   POTASSIUM mmol/L 3.8   CHLORIDE mmol/L 103   CREATININE mg/dL 0.8   BUN mg/dL 16   CALCIUM mg/dL 8.8   ALK PHOS U/L 45*   TOTAL PROTEIN g/dL 6.3*   ALT (SGPT) U/L 21*   AST (SGOT) U/L 21   BILIRUBIN mg/dL 0.5   ALBUMIN g/dL 3.8       Lab Results - Last 18 Months   Lab Units 08/23/18  0736   CEA ng/mL 0.81       Lab Results - Last 18 Months   Lab Units 10/23/18  1609 08/23/18  0738   IRON ug/dL 72  --    TSH u[iU]/mL  --  2.03         Assessment/Plan     Patient Active Problem List   Diagnosis   • Essential hypertension   • Osteoporosis   • History of breast cancer   • Visit for gynecologic examination   • Screening for osteoporosis   • Malignant neoplasm of upper-inner quadrant of left breast in female, estrogen receptor positive (CMS/HCC)   • Hypercholesterolemia   • History of mammogram   • History of colonoscopy   • Abnormal mammogram   • Fat necrosis        1. Malignant neoplasm of upper-inner quadrant of left breast in female, estrogen receptor positive (CMS/HCC)  Carcinoma of the left breast,  moderately differentiated infiltrating ductal carcinoma and ductal carcinoma in situ (DCIS) diagnosed in 2011 with the following profile:   Tumor size 1 x 0.7 x 0.7 cm.   Baseline Stage:  AJCC Stage IB (pT1b N1mic M0 G2).   Receptor Status: ER 99%, ME 60%, HER-2/robert at +1.   Baseline Node Status:  1 of 2 positive, mo.    Tumor status: OLIVIA.  Mammogram performed on 7/1/2019 negative.     Mrs. Stephen is doing very well continuing to show no signs of recurrence of her disease.  Her mammogram is up-to-date and unremarkable.  She is asymptomatic and labs are stable.  Her markers are remaining within normal range.  We will continue to follow.    2. Other osteoporosis, unspecified pathological fracture presence  Recent bone mineral density study took place On July 1, 2019 that showed mild improvement and this is being managed by Dr. Pastrana.  She is on Fosamax and tolerating it well per his management.    3. Essential hypertension  Blood pressure today is stable at 136/72 with a pulse of 72.  She was advised to continue following with primary care provider for management    4. Hypercholesterolemia  Patient continues on Lipitor for cholesterol.  She was encouraged to continue the same and again follow with Dr. Pastrana.       PLAN  1. Continue yearly surveillance  2. Encouraged patient to continue to follow with PCP and other specialists  3. Obtain pre-office lab CBC with differential, CMP, CEA and CA27-29.  At [facility] before visit.    4.  Return to the [place] office 12 months for followup.    Orders Placed This Encounter   Procedures   • CEA     Standing Status:   Future   • CA 27.29 (Serial Monitor)     Standing Status:   Future   • CA 27.29 (Serial Monitor)     Standing Status:   Future   • CEA     Standing Status:   Future   • Comprehensive Metabolic Panel     Standing Status:   Future   • CBC & Differential     Standing Status:   Future     Standing Expiration Date:   10/15/2020     Order  Specific Question:   Manual Differential     Answer:   No         I have spent a total of  _26___  minutes in face-to-face encounter with the patient, out of which more than 50% was counseling the patient and family regarding coordination of care.  Counseling included but not limited to time spent reviewing labs, treatment plan as well as answering questions.     All activities occurring within this visit are in accordance with the plan of care as set forth by Dr Edward Barber.    Justine Shaver, TUSHAR    10/16/2019    7:35 PM

## 2019-10-21 ENCOUNTER — RESULTS ENCOUNTER (OUTPATIENT)
Dept: ONCOLOGY | Facility: CLINIC | Age: 64
End: 2019-10-21

## 2019-10-21 DIAGNOSIS — C50.212 MALIGNANT NEOPLASM OF UPPER-INNER QUADRANT OF LEFT BREAST IN FEMALE, ESTROGEN RECEPTOR POSITIVE (HCC): ICD-10-CM

## 2019-10-21 DIAGNOSIS — Z17.0 MALIGNANT NEOPLASM OF UPPER-INNER QUADRANT OF LEFT BREAST IN FEMALE, ESTROGEN RECEPTOR POSITIVE (HCC): ICD-10-CM

## 2020-06-16 ENCOUNTER — TELEPHONE (OUTPATIENT)
Dept: OBSTETRICS AND GYNECOLOGY | Facility: CLINIC | Age: 65
End: 2020-06-16

## 2020-06-17 DIAGNOSIS — Z12.31 ENCOUNTER FOR SCREENING MAMMOGRAM FOR MALIGNANT NEOPLASM OF BREAST: Primary | ICD-10-CM

## 2020-09-02 ENCOUNTER — TELEPHONE (OUTPATIENT)
Dept: ONCOLOGY | Facility: CLINIC | Age: 65
End: 2020-09-02

## 2020-10-16 ENCOUNTER — RESULTS ENCOUNTER (OUTPATIENT)
Dept: ONCOLOGY | Facility: CLINIC | Age: 65
End: 2020-10-16

## 2020-10-16 DIAGNOSIS — C50.212 MALIGNANT NEOPLASM OF UPPER-INNER QUADRANT OF LEFT BREAST IN FEMALE, ESTROGEN RECEPTOR POSITIVE (HCC): ICD-10-CM

## 2020-10-16 DIAGNOSIS — Z17.0 MALIGNANT NEOPLASM OF UPPER-INNER QUADRANT OF LEFT BREAST IN FEMALE, ESTROGEN RECEPTOR POSITIVE (HCC): ICD-10-CM

## 2020-11-04 DIAGNOSIS — Z17.0 MALIGNANT NEOPLASM OF UPPER-INNER QUADRANT OF LEFT BREAST IN FEMALE, ESTROGEN RECEPTOR POSITIVE (HCC): Primary | ICD-10-CM

## 2020-11-04 DIAGNOSIS — C50.212 MALIGNANT NEOPLASM OF UPPER-INNER QUADRANT OF LEFT BREAST IN FEMALE, ESTROGEN RECEPTOR POSITIVE (HCC): Primary | ICD-10-CM

## 2020-11-09 ENCOUNTER — RESULTS ENCOUNTER (OUTPATIENT)
Dept: ONCOLOGY | Facility: CLINIC | Age: 65
End: 2020-11-09

## 2020-11-09 DIAGNOSIS — Z17.0 MALIGNANT NEOPLASM OF UPPER-INNER QUADRANT OF LEFT BREAST IN FEMALE, ESTROGEN RECEPTOR POSITIVE (HCC): ICD-10-CM

## 2020-11-09 DIAGNOSIS — C50.212 MALIGNANT NEOPLASM OF UPPER-INNER QUADRANT OF LEFT BREAST IN FEMALE, ESTROGEN RECEPTOR POSITIVE (HCC): ICD-10-CM

## 2020-11-16 NOTE — PROGRESS NOTES
[facility] HEMATOLOGY AND ONCOLOGY  [address]  [place] [address] [phone]    Patient Name: Ryann Stephen  Encounter Date: 11/18/2020  YOB: 1955  Patient Number: [id number]      REASON FOR VISIT:  Ms. Ryann Stephen is a 65-year-old female who is seen in follow-up for carcinoma of the left breast. She is seen 117.5 months post diagnosis and 54 months since cessation of adjuvant Arimidex (completed 60 months of therapy). The patient is here with her spouse, Miguel. History is obtained from the patient who is considered to be a reliable historian.     DIAGNOSTIC ABNORMALITIES:   1. Mammography on 12/28/2010 revealed fibroglandular densities in the breast parenchyma, numerous benign-appearing calcifications in each breast, and a questionable area of increasing nodular density in the outer middle third of the left breast. This area was associated with microcalcification and was an indeterminate finding. Further evaluation was advised.  2. Cone-down spot compression views on 01/11/2011 revealed a spiculated lesion in the left breast strongly suspicious for carcinoma. Biopsy was advised.  3. Stereotactic mammotome biopsy at [facility] in [place], [place] on 01/14/2011 showed moderately differentiated invasive ductal carcinoma with ductal carcinoma in situ. The findings are compatible with a primary breast cancer. The malignancy was characterized further as 99% ER positive, 60% LA positive, and HER-2/robert of +1.  4. Left breast needle localization biopsy (excisional) and node accession, 02/14/2011. She was found to have residual infiltrating ductal carcinoma, high grade. She was found to have a 1 x 0.7 x 0.7 cm high grade invasive ductal carcinoma, Joya score = 8. Also noted were calcifications and benign breast elements, healing prior biopsy site, and no satellite lesions. One of the two  accessed lymph nodes was positive with the largest deposit measuring 0.75 mm. Extranodal extension was identified. The tumor was staged as an AJCC pT1b, N1mic, MX, G3.  5. Further staging studies at [facility] on 03/08/2011 included a negative chest CT, brain CT, and nuclear bone scan. The multinodular goiter is noted.    PREVIOUS INTERVENTIONS:   1. Left breast needle localization biopsy (excisional) and node accession, 02/14/2011.  2. T/C (Taxotere and Cytoxan). From 03/10/2011 through 05/11/2011. Four cycles completed.  3. Adjuvant Arimidex. From 05/30/2011 through 05/30/2016  4. XRT to the left breast. From 06/21/2011 through 08/01/2011.        Problem List Items Addressed This Visit        Other    Malignant neoplasm of upper-inner quadrant of left breast in female, estrogen receptor positive (CMS/HCC) - Primary        Oncology/Hematology History Overview Note   DIAGNOSTIC ABNORMALITIES:  Mammography on 12/28/2010 revealed fibroglandular densities in the breast parenchyma, numerous benign-appearing calcifications in each breast, and a questionable area of increasing nodular density in the outer middle third of the left breast. This area was associated with microcalcification and was an indeterminate finding.  Further evaluation was advised.  Cone-down spot compression views on 01/11/2011 revealed a spiculated lesion in the left breast strongly suspicious for carcinoma.  Biopsy was advised.  Stereotactic mammotome biopsy at [facility] in [place], [place] on 01/14/2011 showed moderately differentiated invasive ductal carcinoma with ductal carcinoma in situ. The findings are compatible with a primary breast cancer. The malignancy was characterized further as 99% ER positive, 60% CO positive, and HER-2/robert of +1.  Left breast needle localization biopsy (excisional) and node accession, 02/14/2011. She was found to have residual infiltrating ductal carcinoma, high grade. She was found  to have a 1 x 0.7 x 0.7 cm high grade invasive ductal carcinoma, [place] score = 8. Also noted were calcifications and benign breast elements, healing prior biopsy site, and no satellite lesions. One of the two accessed lymph nodes was positive with the largest deposit measuring 0.75 mm. Extranodal extension was identified. The tumor was staged as an AJCC pT1b, N1mic, MX, G3.  Further staging studies at [facility] on 03/08/2011 included a negative chest CT, brain CT, and nuclear bone scan.  The multinodular goiter is noted.  PREVIOUS INTERVENTIONS:  Left breast needle localization biopsy (excisional) and node accession, 02/14/2011.  T/C (Taxotere and Cytoxan).  From 03/10/2011 through 05/11/2011. Four cycles completed.  Adjuvant Arimidex. From 05/30/2011 through 05/30/2016  XRT to the left breast. From 06/21/2011 through 08/01/2011.       Malignant neoplasm of upper-inner quadrant of left breast in female, estrogen receptor positive (CMS/HCC)       PAST MEDICAL HISTORY:  ALLERGIES:  No Known Allergies  CURRENT MEDICATIONS:  Outpatient Encounter Medications as of 11/18/2020   Medication Sig Dispense Refill   • Dulaglutide (Trulicity) 0.75 MG/0.5ML solution pen-injector Inject 0.75 mg under the skin into the appropriate area as directed.     • fenofibrate 160 MG tablet Take 160 mg by mouth Daily.     • levothyroxine (SYNTHROID, LEVOTHROID) 50 MCG tablet Take 50 mcg by mouth.     • metFORMIN (GLUCOPHAGE) 1000 MG tablet Take 1,000 mg by mouth.     • atorvastatin (LIPITOR) 80 MG tablet TAKE 1 TABLET DAILY 90 tablet 3   • calcium carbonate (OS-HO) 600 MG tablet Take 600 mg by mouth Daily.     • Dapagliflozin Propanediol (FARXIGA) 10 MG tablet Take  by mouth.     • Lactobacillus (PROBIOTIC ACIDOPHILUS PO) Take  by mouth.     • losartan (COZAAR) 50 MG tablet Take 50 mg by mouth Daily.     • Misc. Devices (ILLUSIONS C BREAST PROSTHESIS) [facility] New breast prosthesis for the left breast 1 each 0   •  spironolactone (ALDACTONE) 50 MG tablet Take 1 tablet by mouth 2 (Two) Times a Day.     • [DISCONTINUED] alendronate (FOSAMAX) 70 MG tablet Take 1 tablet by mouth Every 7 (Seven) Days. 12 tablet 12     No facility-administered encounter medications on file as of 2020.    ADULT ILLNESSES:   Carcinoma of breast ( Stage Date: Unknown, Stage IB (T1b, pN1mi, M0)-Pathological  Date of Dx: ER Status: Positive; CT Status: Positive; HER-2/robert Status: Positive; Menopausal Status: Postmenopausal; Histopathologic Type: DCIS; Laterality: Left; ER-%: 99; CT-%: 60; HER-2/robert Value-IHC: 1+; Grade 2; ICD-10:D05.12 ;Intraductal carcinoma in situ of left breast )   Infiltrating ductal Ca of breast ( ICD-10:C50.512 ;Malignant neoplasm of lower-outer quadrant of left female breast   Diabetes mellitus type II ( type 2; ICD-10:E11.9 ;Type 2 diabetes mellitus without complications )   Glucose intolerance ( ICD-10:E74.39 ;Other disorders of intestinal carbohydrate absorption   Goiter ( ICD-10:E04.9 ;Nontoxic goiter, unspecified   Hyperlipidemia ( ICD-10:E78.5 ;Hyperlipidemia, unspecified   Hypertension ( ICD-10:I10 ;Essential (primary) hypertension   Hyponatremia ( ICD-10:E87.1 ;Hypo-osmolality and hyponatremia   Irritable bowel ( ICD-10:K58.9 ;Irritable bowel syndrome without diarrhea   Osteoporosis ( ICD-10:M81.0 ;Age-related osteoporosis without current pathological fracture  SURGERIES:    section, ()   Cholecystectomy   Extraction of impacted wisdom tooth   Tubal ligation    ADULT ILLNESSES:  Patient Active Problem List   Diagnosis Code   • Essential hypertension I10   • Osteoporosis M81.0   • History of breast cancer Z85.3   • Visit for gynecologic examination Z01.419   • Screening for osteoporosis Z13.820   • Malignant neoplasm of upper-inner quadrant of left breast in female, estrogen receptor positive (CMS/HCC) C50.212, Z17.0   • Hypercholesterolemia E78.00   • History of mammogram Z92.89   • History of  colonoscopy Z98.890   • Abnormal mammogram R92.8   • Fat necrosis M79.89     SURGERIES:  Past Surgical History:   Procedure Laterality Date   • BREAST BIOPSY     • BREAST LUMPECTOMY Left    •  SECTION      primary low transverse  section for cephalopelvic disproportion;  Last Performed:    • CHOLECYSTECTOMY     • TUBAL ABDOMINAL LIGATION      shantell laparoscopic bilateral tubal fulguration, undesired fertility;  Last Performed: 10/20/1994   • WISDOM TOOTH EXTRACTION       wisdom teeth extraction x 4     HEALTH MAINTENANCE ITEMS:  Health Maintenance Due   Topic Date Due   • COLONOSCOPY  1955   • TDAP/TD VACCINES (1 - Tdap) 1973   • ZOSTER VACCINE (1 of 2) 2004   • HEPATITIS C SCREENING  2016   • ANNUAL WELLNESS VISIT  2016   • DIABETIC FOOT EXAM  2016   • DIABETIC EYE EXAM  2016   • Pneumococcal Vaccine 65+ (1 of 1 - PPSV23) 2019   • INFLUENZA VACCINE  2020       <no information>  Last Completed Colonoscopy       Status Date      COLONOSCOPY No completions recorded          There is no immunization history on file for this patient.  Last Completed Mammogram       Status Date      MAMMOGRAM Done 2020 MAMMO SCREENING DIGITAL TOMOSYNTHESIS BILATERAL W CAD     Patient has more history with this topic...            FAMILY HISTORY:  Family History   Problem Relation Age of Onset   • Coronary artery disease Father    • Stroke Father    • Hypertension Mother    • Pancreatic cancer Mother    • Diabetes Mother    • Breast cancer Maternal Aunt    • Cancer Maternal Grandmother         female   • No Known Problems Maternal Grandfather    • Liver cancer Paternal Grandmother    • No Known Problems Paternal Grandfather      SOCIAL HISTORY:  Social History     Socioeconomic History   • Marital status:      Spouse name: Not on file   • Number of children: Not on file   • Years of education: Not on file   • Highest education level: Not on file  "  Tobacco Use   • Smoking status: Never Smoker   • Smokeless tobacco: Never Used   Substance and Sexual Activity   • Alcohol use: No   • Drug use: No   • Sexual activity: Defer       REVIEW OF SYSTEMS:  Performance Status:   ECOG 0.  Constitutional:   The patient's appetite and energy are good. She remains active. Says she still walks 3 miles/at least 5 days a week. She has regained 1 pound (had lost 3 pounds at her prior visit) since her last visit.  She has no fevers, chills, or drenching night sweats. Her sleep habits seem appropriate.  Ear/Nose/Mouth/Throat:   She reports no ear pains, sinus symptoms, sore throat, nosebleeds, or sore tongue. She reports no headaches. She reports no hoarseness, change in voice quality, or hemoptysis.   Ocular:   She reports no eye pain, significant change in visual acuity, double vision, or blurry vision.\"My prescription hasn't changed.\"  Respiratory:   She reports no chronic cough, significant shortness of breathing, phlegm production, or unexplained chest wall pain.  Cardiovascular:   She reports no exertional chest pain, chest pressure, or chest heaviness. She reports no claudication. She reports no palpitations or symptomatic orthostasis.  Gastrointestinal:   She reports no dysphagia, nausea, vomiting, postprandial abdominal pain, bloating, cramping, change in bowel habits, or discoloration of the stool. She reports no rectal bleeding. She reports no constipation or diarrhea.  Genitourinary:   She reports no recent UTIs.  No urinary burning, frequency, dribbling, or discoloration. She reports no need to urinate frequently through the night. She reports no difficulty controlling her bladder.  GYN:   She reports no unexplained vaginal bleeding and no significant vaginal discharge. Menopause age 44.  Breasts:   She reports no breast pain, nipple discharge, or bleeding. She reports no palpable breast masses. BSE is ongoing and unrevealing.  Musculoskeletal:   She has not had any " "arthralgias of the ankles and knees since she has been walking daily. She has no myalgias or nighttime leg cramping.  Extremities:   She reports no trouble with fluid retention or significant leg swelling.  Endocrine:   She reports no problems with excess thirst, excessive urination, vasomotor instability, or unexplained fatigue.  Heme/Lymphatic:   She reports no unexplained bleeding, bruising, petechial rash, or swollen glands.  Skin:   She reports no itching, rashes, or lesions which won't heal.  Neuro:   She reports no loss of consciousness, seizures, fainting spells, or dizziness. She reports no weakness of her face, arms, or legs. She reports no difficulty with speech. She has no tremors. She has no paresthesias.  Psych:   She reports no depression nor anxiety.         VITAL SIGNS: /64   Pulse 75   Temp 97.8 °F (36.6 °C)   Resp 16   Ht 160 cm (63\")   Wt 72.3 kg (159 lb 4.8 oz)   LMP  (LMP Unknown)   SpO2 98%   Breastfeeding No   BMI 28.22 kg/m² Body surface area is 1.76 meters squared.  Pain Score    11/18/20 1525   PainSc: 0-No pain         PHYSICAL EXAMINATION:   General Exam:   She is a pleasant, cooperative, , well-developed, well-nourished, and modestly-kept female who is comfortable at rest. She arrived in the exam room ambulatory. She appears to be her stated age. Her skin color is normal. ECOG 0  Head/Neck:   The patient is anicteric and atraumatic. The trachea is midline. The neck is supple without evidence of jugular venous distention or cervical adenopathy.  Eyes:   The pupils are equal, round, and reactive to light. The extraocular movements are full. There is no scleral jaundice or erythema.  Chest:   The respiratory efforts are normal and unhindered. The chest is clear to auscultation. There are no wheezes, rhonchi, rales, or asymmetry of breath sounds. The port in the right upper chest is well seated.  Cardiovascular:   The patient has a regular cardiac rate and rhythm without " murmurs, rubs, or gallops. The peripheral pulses are equal and full.  Breast:   The breasts are dense. The right breast is devoid of masses or axillary adenopathy. Inspection of the left lumpectomy site reveals no erythema or worrisome nodularity to suggest tumor recurrence.  Extremities:   There is no evidence of cyanosis, clubbing, or edema.  Rheumatologic:   There is no overt evidence of rheumatoid deformities of the hands. There is no sausaging of the fingers. There is no sign of active synovitis. The gait is normal.  Cutaneous:   There are no overt rashes, disseminated lesions, purpura, or petechiae.  Lymphatics:   There is no evidence of adenopathy in the cervical, supraclavicular, or axillary areas.  Neurologic:   The patient is alert, oriented, cooperative, and pleasant. She is appropriately conversant. She ambulated into the exam room without assistance and transferred from chair to exam table unaided. There is no overt dysfunction of the motor, sensory, or cerebellar systems.  Psych:   Mood and affect are appropriate for circumstance. Eye contact is appropriate.        LABS    Lab Results - Last 18 Months   Lab Units 12/04/19  0857 10/09/19  0827   HEMOGLOBIN g/dL 14.4 13.9   HEMATOCRIT % 43.1 42.1   MCV fL 92 92   WBC 10*3/uL 6.5 7.8   RDW fL 41 42.3   MPV fL 9.3 9.8   PLATELETS 10*3/uL 335 346   IMM GRAN % % 56.7 57.6   NRBC % 0 0       No results for input(s): GLUCOSE, NA, K, CO2, CL, ANIONGAP, CREATININE, BUN, BCR, CALCIUM, EGFRIFNONA, ALKPHOS, PROTEINTOT, ALT, AST, BILITOT, ALBUMIN, GLOB in the last [id number] hours.    Invalid input(s): LABIL    Lab Results - Last 18 Months   Lab Units 11/10/20  0901 10/22/19  1315   CEA ng/mL 1.11 0.88     ASSESSMENT:   1. Carcinoma of the left breast, moderately differentiated infiltrating ductal carcinoma and ductal carcinoma in situ (DCIS). Tumor size 1 x 0.7 x 0.7 cm.   Baseline Stage: AJCC Stage IB (pT1b N1mic M0 G2).   Receptor Status: ER 99%, NH 60%, HER-2/robert at  +1.   Baseline Node Status: 1 of 2 positive, mo.   Menopausal Status at Diagnosis: Postmenopausal (1999).   Tumor status: Mammogram performed on 06/28/2018 at [facility]. Increasing architectural distortion in the medial aspect of the left breast associated with multiple oil cysts, possibly due to previous surgery but does not correlate with the placement of the scar marker which was present on the lateral portion of the left breast on the exams from 2012 and 2013. Review of the mammographic report from 12/28/2010 states that the original mass was in the slightly outer, middle one third of the left breast. Recommend spot compression views, 90 degree true lateral view, and ultrasound for further evaluation. Please correlate with the patient regarding a possible surgical intervention at this site.   Left unilateral diagnostic mammogram with 3-D tomosynthesis with CAD and ultrasound left breast performed on 07/03/2018 at [facility]. Left breast 9 to 10:00 position and 9:00 position mass lesions as described above which are highly suggestive of malignancy. Biopsy is recommended to further evaluate   Surgical pathology performed on 07/09/2018 at [facility]. Left breast, 9 o'clock position, mammotome biopsy: fat necrosis with microcalcification. No evidence of neoplasm. 2. Left breast, 9:30 position, mammotome biopsy: fat necrosis with microcalcification. No evidence of neoplasm.  07/03/2020-mammogram.  No mammographic evidence of malignancy.  BI-RADS Category 2: Benign findings.  Recommendation: Annual mammography.  2. Osteoporosis. On weekly Fosamax (with calcium + vitamin D) by PCP -  Friday.  a. Bone density, 06/09/2015 [place], [place]: CONCLUSION. Bilateral femoral neck osteopenia, fracture risk increased. Lumbar spine osteoporosis. Fracture risk high.   b. Bone Density performed on 06/20/2017 at [facility] was revealing for L1-L4 bone mineral density (BMD): 0.856 grams per square  centimeter 1.7 standard deviations (T score) below the mean compared to a young normal. T score is -1.7. Mean of bilateral femoral necks, bone mineral density: 0.695 grams per square centimeter 1.4 standard deviations (T score) below the mean compared to a young normal. Lumbar spine: Osteopenia. Bilateral femoral neck: Osteopenia. T score is -1.4. IMPRESSION: 1. Bone mineral density measurements as above. 2. Lumbar spine: Osteopenia. 3. Bilateral femoral neck: Osteopenia.   3. Hypertension. On Losartan and aldactone  4. Hyperlipidemia. On Lipitor  5. Possible irritable bowel. Quiescent.   6. Hypothyroidism.  Synthroid  7. Diabetes. Now on metformin and Trulicity.   8. Early rheumatoid arthritis, 2013. Quiescent.   9. Self-directed - refuses BCI and missed appointment last 03/2017.     PLAN:   1. Apprised of labs from 11/02/2020 and 11/10/2020. Stable (normal) CBC, mild hyperglycemia, normal bilirubin with otherwise stable CMP, and normal tumor markers (CA 27-29 = 11.7; from 11.4; CEA = 1.1; from 0.81).   2. Fax labs from 11/02/2020 to PCP.   3.  Re: The mammogram, 7/30/2020.  OLIVIA  4. Previously discussed the possible benefit of extending therapy with AIs for another 5 years (MA. 17R study showing 4% increased DFS), noting the possible downsides of therapy (11% new onset osteoporosis, 14% bone fractures). Again discussed Breast Cancer Index study but ultimately patient refused.  5. Continue current medications.   6. Continue ongoing management per primary care physician and other specialists.  7. Mammograms per  Friday - patient says they schedule - repeat scheduled for 10/2018 prior to the annual study 06/2019.  8. Obtain pre-office lab CBC with differential, CMP, CEA and CA27-29. At [facility] or [facility] week before visit.   9. Return to the [place] office 12 months.    MEDICAL DECISION MAKING: Moderate Complexity   AMOUNT OF DATA: Moderate   TIME SPENT: Face-to-face time on this  encounter, as defined by the     I spent 27 total minutes, face-to-face, caring for Ryann today.  Greater than 50% of this time involved counseling and/or coordination of care as documented within this note regarding the patient's illness(es), pros and cons of various treatment options, instructions and/or risk reduction.    cc: Aries Pastrana MD ([facility])        Spring Godinez MD ([facility])

## 2020-11-18 ENCOUNTER — OFFICE VISIT (OUTPATIENT)
Dept: ONCOLOGY | Facility: CLINIC | Age: 65
End: 2020-11-18

## 2020-11-18 VITALS
HEIGHT: 63 IN | WEIGHT: 159.3 LBS | BODY MASS INDEX: 28.23 KG/M2 | RESPIRATION RATE: 16 BRPM | SYSTOLIC BLOOD PRESSURE: 118 MMHG | TEMPERATURE: 97.8 F | OXYGEN SATURATION: 98 % | DIASTOLIC BLOOD PRESSURE: 64 MMHG | HEART RATE: 75 BPM

## 2020-11-18 DIAGNOSIS — Z17.0 MALIGNANT NEOPLASM OF UPPER-INNER QUADRANT OF LEFT BREAST IN FEMALE, ESTROGEN RECEPTOR POSITIVE (HCC): Primary | ICD-10-CM

## 2020-11-18 DIAGNOSIS — C50.212 MALIGNANT NEOPLASM OF UPPER-INNER QUADRANT OF LEFT BREAST IN FEMALE, ESTROGEN RECEPTOR POSITIVE (HCC): Primary | ICD-10-CM

## 2020-11-18 PROCEDURE — 99214 OFFICE O/P EST MOD 30 MIN: CPT | Performed by: INTERNAL MEDICINE

## 2020-11-18 RX ORDER — FENOFIBRATE 160 MG/1
160 TABLET ORAL DAILY
COMMUNITY
Start: 2020-08-19

## 2020-11-18 RX ORDER — LEVOTHYROXINE SODIUM 0.05 MG/1
50 TABLET ORAL
COMMUNITY
Start: 2020-08-19

## 2020-11-18 RX ORDER — DULAGLUTIDE 0.75 MG/.5ML
0.75 INJECTION, SOLUTION SUBCUTANEOUS
COMMUNITY
Start: 2020-11-06

## 2021-06-28 ENCOUNTER — TELEPHONE (OUTPATIENT)
Dept: OBSTETRICS AND GYNECOLOGY | Facility: CLINIC | Age: 66
End: 2021-06-28

## 2021-06-28 DIAGNOSIS — Z12.31 ENCOUNTER FOR SCREENING MAMMOGRAM FOR MALIGNANT NEOPLASM OF BREAST: ICD-10-CM

## 2021-06-28 DIAGNOSIS — Z13.820 OSTEOPOROSIS SCREENING: Primary | ICD-10-CM

## 2021-06-28 DIAGNOSIS — M85.852 OSTEOPENIA OF BOTH HIPS: ICD-10-CM

## 2021-06-28 DIAGNOSIS — M85.851 OSTEOPENIA OF BOTH HIPS: ICD-10-CM

## 2021-06-28 NOTE — TELEPHONE ENCOUNTER
Pt called requesting to schedule a mammogram and BD. Pt is already scheduled.    Can you please put orders in?    Thanks.

## 2021-07-14 ENCOUNTER — TELEPHONE (OUTPATIENT)
Dept: OBSTETRICS AND GYNECOLOGY | Facility: CLINIC | Age: 66
End: 2021-07-14

## 2021-07-14 NOTE — TELEPHONE ENCOUNTER
----- Message from TUSHAR Oh sent at 7/14/2021 10:47 AM CDT -----  Osteopenia on DEXA; continue Os-Julian supplement and increase weight bearing exercises. Next due in 2 years

## 2021-11-09 ENCOUNTER — TELEPHONE (OUTPATIENT)
Dept: ONCOLOGY | Facility: CLINIC | Age: 66
End: 2021-11-09

## 2021-11-09 NOTE — TELEPHONE ENCOUNTER
Provider: CONCEPCION   Caller: BETO MERCER  Relationship to Patient: SELF    Phone Number: [phone]  Reason for Call: PT NEEDS [facility] ORD[facility]  FAX# [phone]  PT HAS APT ON Monday 11/22/2021.

## 2021-11-17 NOTE — PROGRESS NOTES
MGW ONC [facility] GROUP HEMATOLOGY AND ONCOLOGY  [address]  [facility] [address] [phone]    Patient Name: Ryann Stephen  Encounter Date: 11/22/2021  YOB: 1955  Patient Number: [id number]      REASON FOR VISIT:  Ms. Ryann Stephen is a 66-year-old female who is seen in follow-up for carcinoma of the left breast. She is seen 129.5 months post diagnosis and 66 months since cessation of adjuvant Arimidex (completed 60 months of therapy). The patient is here alone (previously with her spouse, Miguel).      I have reviewed the HPI and verified with the patient the accuracy of it. No changes to interval history since the information was documented. Edward Barber MD 11/22/21     DIAGNOSTIC ABNORMALITIES:   1. Mammography on 12/28/2010 revealed fibroglandular densities in the breast parenchyma, numerous benign-appearing calcifications in each breast, and a questionable area of increasing nodular density in the outer middle third of the left breast. This area was associated with microcalcification and was an indeterminate finding. Further evaluation was advised.  2. Cone-down spot compression views on 01/11/2011 revealed a spiculated lesion in the left breast strongly suspicious for carcinoma. Biopsy was advised.  3. Stereotactic mammotome biopsy at [facility] in [place], [place] on 01/14/2011 showed moderately differentiated invasive ductal carcinoma with ductal carcinoma in situ. The findings are compatible with a primary breast cancer. The malignancy was characterized further as 99% ER positive, 60% MO positive, and HER-2/robert of +1.  4. Left breast needle localization biopsy (excisional) and node accession, 02/14/2011. She was found to have residual infiltrating ductal carcinoma, high grade. She was found to have a 1 x 0.7 x 0.7 cm high grade invasive ductal carcinoma, Longmont score = 8. Also noted were  calcifications and benign breast elements, healing prior biopsy site, and no satellite lesions. One of the two accessed lymph nodes was positive with the largest deposit measuring 0.75 mm. Extranodal extension was identified. The tumor was staged as an AJCC pT1b, N1mic, MX, G3.  5. Further staging studies at [facility] on 03/08/2011 included a negative chest CT, brain CT, and nuclear bone scan. The multinodular goiter is noted.    PREVIOUS INTERVENTIONS:   1. Left breast needle localization biopsy (excisional) and node accession, 02/14/2011.  2. T/C (Taxotere and Cytoxan). From 03/10/2011 through 05/11/2011. Four cycles completed.  3. Adjuvant Arimidex. From 05/30/2011 through 05/30/2016  4. XRT to the left breast. From 06/21/2011 through 08/01/2011.        Problem List Items Addressed This Visit        Other    Malignant neoplasm of upper-inner quadrant of left breast in female, estrogen receptor positive (HCC) - Primary        Oncology/Hematology History Overview Note   DIAGNOSTIC ABNORMALITIES:  Mammography on 12/28/2010 revealed fibroglandular densities in the breast parenchyma, numerous benign-appearing calcifications in each breast, and a questionable area of increasing nodular density in the outer middle third of the left breast. This area was associated with microcalcification and was an indeterminate finding.  Further evaluation was advised.  Cone-down spot compression views on 01/11/2011 revealed a spiculated lesion in the left breast strongly suspicious for carcinoma.  Biopsy was advised.  Stereotactic mammotome biopsy at [facility] in [place], [place] on 01/14/2011 showed moderately differentiated invasive ductal carcinoma with ductal carcinoma in situ. The findings are compatible with a primary breast cancer. The malignancy was characterized further as 99% ER positive, 60% MN positive, and HER-2/robert of +1.  Left breast needle localization biopsy (excisional) and node  accession, 02/14/2011. She was found to have residual infiltrating ductal carcinoma, high grade. She was found to have a 1 x 0.7 x 0.7 cm high grade invasive ductal carcinoma, Joya score = 8. Also noted were calcifications and benign breast elements, healing prior biopsy site, and no satellite lesions. One of the two accessed lymph nodes was positive with the largest deposit measuring 0.75 mm. Extranodal extension was identified. The tumor was staged as an AJCC pT1b, N1mic, MX, G3.  Further staging studies at [facility] on 03/08/2011 included a negative chest CT, brain CT, and nuclear bone scan.  The multinodular goiter is noted.  PREVIOUS INTERVENTIONS:  Left breast needle localization biopsy (excisional) and node accession, 02/14/2011.  T/C (Taxotere and Cytoxan).  From 03/10/2011 through 05/11/2011. Four cycles completed.  Adjuvant Arimidex. From 05/30/2011 through 05/30/2016  XRT to the left breast. From 06/21/2011 through 08/01/2011.       Malignant neoplasm of upper-inner quadrant of left breast in female, estrogen receptor positive (HCC)       PAST MEDICAL HISTORY:  ALLERGIES:  No Known Allergies  CURRENT MEDICATIONS:  Outpatient Encounter Medications as of 11/22/2021   Medication Sig Dispense Refill   • atorvastatin (LIPITOR) 80 MG tablet TAKE 1 TABLET DAILY 90 tablet 3   • calcium carbonate (OS-HO) 600 MG tablet Take 600 mg by mouth Daily.     • cyanocobalamin 1000 MCG/ML injection Inject 1,000 mcg into the appropriate muscle as directed by prescriber 1 (One) Time. Every 2 weeks     • Dapagliflozin Propanediol (FARXIGA) 10 MG tablet Take  by mouth.     • docusate sodium 100 MG capsule Take 100 mg by mouth Daily.     • Dulaglutide (Trulicity) 0.75 MG/0.5ML solution pen-injector Inject 0.75 mg under the skin into the appropriate area as directed.     • fenofibrate 160 MG tablet Take 160 mg by mouth Daily.     • levothyroxine (SYNTHROID, LEVOTHROID) 50 MCG tablet Take 50 mcg by mouth.      • losartan (COZAAR) 50 MG tablet Take 50 mg by mouth Daily.     • magnesium oxide (MAG-OX) 400 MG tablet Take 400 mg by mouth Daily.     • metFORMIN (GLUCOPHAGE) 1000 MG tablet Take 1,000 mg by mouth.     • Misc. Devices (Illusions C Breast Prosthesis) [facility] New breast prosthesis for the left breast 1 each 0   • spironolactone (ALDACTONE) 50 MG tablet Take 1 tablet by mouth 2 (Two) Times a Day.     • [DISCONTINUED] Lactobacillus (PROBIOTIC ACIDOPHILUS PO) Take  by mouth.       No facility-administered encounter medications on file as of 2021.   ADULT ILLNESSES:   Carcinoma of breast ( Stage Date: Unknown, Stage IB (T1b, pN1mi, M0)-Pathological  Date of Dx: ER Status: Positive; AR Status: Positive; HER-2/robert Status: Positive; Menopausal Status: Postmenopausal; Histopathologic Type: DCIS; Laterality: Left; ER-%: 99; AR-%: 60; HER-2/robert Value-IHC: 1+; Grade 2; ICD-10:D05.12 ;Intraductal carcinoma in situ of left breast )   Infiltrating ductal Ca of breast ( ICD-10:C50.512 ;Malignant neoplasm of lower-outer quadrant of left female breast   Diabetes mellitus type II ( type 2; ICD-10:E11.9 ;Type 2 diabetes mellitus without complications )   Glucose intolerance ( ICD-10:E74.39 ;Other disorders of intestinal carbohydrate absorption   Goiter ( ICD-10:E04.9 ;Nontoxic goiter, unspecified   Hyperlipidemia ( ICD-10:E78.5 ;Hyperlipidemia, unspecified   Hypertension ( ICD-10:I10 ;Essential (primary) hypertension   Hyponatremia ( ICD-10:E87.1 ;Hypo-osmolality and hyponatremia   Irritable bowel ( ICD-10:K58.9 ;Irritable bowel syndrome without diarrhea   Osteoporosis ( ICD-10:M81.0 ;Age-related osteoporosis without current pathological fracture  SURGERIES:    section, ()   Cholecystectomy   Extraction of impacted wisdom tooth   Tubal ligation    ADULT ILLNESSES:  Patient Active Problem List   Diagnosis Code   • Essential hypertension I10   • Osteoporosis M81.0   • History of breast cancer Z85.3   • Visit  for gynecologic examination Z01.419   • Screening for osteoporosis Z13.820   • Malignant neoplasm of upper-inner quadrant of left breast in female, estrogen receptor positive (HCC) C50.212, Z17.0   • Hypercholesterolemia E78.00   • History of mammogram Z92.89   • History of colonoscopy Z98.890   • Abnormal mammogram R92.8   • Fat necrosis M79.89     SURGERIES:  Past Surgical History:   Procedure Laterality Date   • BREAST BIOPSY     • BREAST LUMPECTOMY Left    •  SECTION      primary low transverse  section for cephalopelvic disproportion;  Last Performed:    • CHOLECYSTECTOMY     • TUBAL ABDOMINAL LIGATION      shantell laparoscopic bilateral tubal fulguration, undesired fertility;  Last Performed: 10/20/1994   • WISDOM TOOTH EXTRACTION       wisdom teeth extraction x 4     HEALTH MAINTENANCE ITEMS:  Health Maintenance Due   Topic Date Due   • COLORECTAL CANCER SCREENING  Never done   • Pneumococcal Vaccine 65+ (1 of 2 - PPSV23) Never done   • TDAP/TD VACCINES (1 - Tdap) Never done   • ZOSTER VACCINE (1 of 2) Never done   • HEPATITIS C SCREENING  Never done   • ANNUAL WELLNESS VISIT  Never done   • DIABETIC FOOT EXAM  Never done   • DIABETIC EYE EXAM  Never done   • URINE MICROALBUMIN  2020   • INFLUENZA VACCINE  Never done   • COVID-19 Vaccine (3 - Booster for Moderna series) 10/07/2021       <no information>  Last Completed Colonoscopy     This patient has no relevant Health Maintenance data.          There is no immunization history on file for this patient.  Last Completed Mammogram          MAMMOGRAM (Yearly) Next due on 2021  Mammo Screening Digital Tomosynthesis Bilateral With CAD    2020  Mammo Screening Digital Tomosynthesis Bilateral With CAD    2019  Mammo Screening Digital Tomosynthesis Bilateral With CAD    10/19/2018  Mammo diagnostic digital tomosynthesis left w CAD    2018  Mammo diagnostic digital tomosynthesis left w CAD    Only  "the first 5 history entries have been loaded, but more history exists.                  FAMILY HISTORY:  Family History   Problem Relation Age of Onset   • Coronary artery disease Father    • Stroke Father    • Hypertension Mother    • Pancreatic cancer Mother    • Diabetes Mother    • Breast cancer Maternal Aunt    • Cancer Maternal Grandmother         female   • No Known Problems Maternal Grandfather    • Liver cancer Paternal Grandmother    • No Known Problems Paternal Grandfather      SOCIAL HISTORY:  Social History     Socioeconomic History   • Marital status:    Tobacco Use   • Smoking status: Never Smoker   • Smokeless tobacco: Never Used   Substance and Sexual Activity   • Alcohol use: No   • Drug use: No   • Sexual activity: Defer       REVIEW OF SYSTEMS:  Performance Status:   ECOG 0.  Constitutional:   The patient's appetite and energy are good. \"All good\".  She remains active. Says she still walks 3 miles/at least 5 days a week. She has lost 16 pounds (had gained 4 pounds at her 2 prior visits) since her last visit.  Says this since being started on thyroid replacement and Trulicity.  She has no fevers, chills, or drenching night sweats. Her sleep habits seem appropriate.  Ear/Nose/Mouth/Throat:   She reports no ear pains, sinus symptoms, sore throat, nosebleeds, or sore tongue. She reports no headaches. She reports no hoarseness, change in voice quality.   Ocular:   She reports no eye pain, significant change in visual acuity, double vision, or blurry vision.\"My prescription hasn't changed.\"  Respiratory:   She reports no chronic cough, significant shortness of breathing, phlegm production, or unexplained chest wall pain.  Cardiovascular:   She reports no exertional chest pain, chest pressure, or chest heaviness. She reports no claudication. She reports no palpitations or symptomatic orthostasis.  Gastrointestinal:   She reports no dysphagia, nausea, vomiting, postprandial abdominal pain, " "bloating, cramping, change in bowel habits, or discoloration of the stool. She reports no rectal bleeding. She reports no constipation or diarrhea.  Genitourinary:   She reports no recent UTIs.  No urinary burning, frequency, dribbling, or discoloration. She reports no need to urinate frequently through the night. She reports no difficulty controlling her bladder.  GYN:   She reports no unexplained vaginal bleeding and no significant vaginal discharge. Menopause age 44.  Breasts:   She reports no breast pain, nipple discharge, or bleeding. She reports no palpable breast masses. BSE is ongoing and unrevealing.  Musculoskeletal:   She has not had any arthralgias of the ankles and knees since she has been walking daily. She has no myalgias or nighttime leg cramping.  Extremities:   She reports no trouble with fluid retention or significant leg swelling.  Endocrine:   She reports no problems with excess thirst, excessive urination, vasomotor instability, or unexplained fatigue.  Heme/Lymphatic:   She reports no unexplained bleeding, bruising, petechial rash, or swollen glands.  Skin:   She reports no itching, rashes, or lesions which won't heal.  Neuro:   She reports no loss of consciousness, seizures, fainting spells, or dizziness. She reports no weakness of her face, arms, or legs. She reports no difficulty with speech. She has no tremors. She has no paresthesias.  Psych:   She reports no depression nor anxiety.       VITAL SIGNS: /82   Pulse 72   Temp 97.2 °F (36.2 °C)   Resp 16   Ht 160 cm (63\")   Wt 65.2 kg (143 lb 11.2 oz)   LMP  (LMP Unknown)   SpO2 98%   Breastfeeding No   BMI 25.46 kg/m² Body surface area is 1.68 meters squared.  Pain Score    11/22/21 1357   PainSc: 0-No pain         PHYSICAL EXAMINATION:   General Exam:   She is a pleasant, cooperative, , well-developed, well-nourished, and modestly-kept female who is comfortable at rest. She arrived in the exam room ambulatory. She appears to " be her stated age. Her skin color is normal. ECOG 0  Head/Neck:   The patient is anicteric and atraumatic. The trachea is midline. The neck is supple without evidence of jugular venous distention or cervical adenopathy.  Eyes:   The pupils are equal, round, and reactive to light. The extraocular movements are full. There is no scleral jaundice or erythema.  Chest:   The respiratory efforts are normal and unhindered. The chest is clear to auscultation. There are no wheezes, rhonchi, rales, or asymmetry of breath sounds. The port in the right upper chest is well seated.  Cardiovascular:   The patient has a regular cardiac rate and rhythm without murmurs, rubs, or gallops. The peripheral pulses are equal and full.  Breast:   Chaperoned exam: Nikki Green MA-  The breasts are dense. The right breast is devoid of masses or axillary adenopathy.  Left breast is smaller than the right.  Inspection of the left lumpectomy site reveals no erythema or worrisome nodularity to suggest tumor recurrence.  Extremities:   There is no evidence of cyanosis, clubbing, or edema.  Rheumatologic:   There is no overt evidence of rheumatoid deformities of the hands. There is no sausaging of the fingers. There is no sign of active synovitis. The gait is normal.  Cutaneous:   There are no overt rashes, disseminated lesions, purpura, or petechiae.  Lymphatics:   There is no evidence of adenopathy in the cervical, supraclavicular, or axillary areas.  Neurologic:   The patient is alert, oriented, cooperative, and pleasant. She is appropriately conversant. She ambulated into the exam room without assistance and transferred from chair to exam table unaided. There is no overt dysfunction of the motor, sensory, or cerebellar systems.  Psych:   Mood and affect are appropriate for circumstance. Eye contact is appropriate.        LABS    Lab Results - Last 18 Months   Lab Units 11/15/21  0816   SODIUM mmol/L 134*   POTASSIUM mmol/L 4.7   TOTAL CO2  mmol/L 23   CHLORIDE mmol/L 102   CREATININE mg/dL 0.7   BUN mg/dL 17   CALCIUM mg/dL 9.6   ALK PHOS U/L 37   ALT (SGPT) U/L 16   AST (SGOT) U/L 18   BILIRUBIN mg/dL 0.53   ALBUMIN g/dL 4.4       Lab Results - Last 18 Months   Lab Units 11/15/21  0816 11/10/20  0901   CEA ng/mL 0.74 1.11     ASSESSMENT:   1. Carcinoma of the left breast, moderately differentiated infiltrating ductal carcinoma and ductal carcinoma in situ (DCIS). Tumor size 1 x 0.7 x 0.7 cm.   Baseline Stage: AJCC Stage IB (pT1b N1mic M0 G2).   Receptor Status: ER 99%, NE 60%, HER-2/robert at +1.   Baseline Node Status: 1 of 2 positive, mo.   Menopausal Status at Diagnosis: Postmenopausal (1999).   Tumor status: Mammogram performed on 06/28/2018 at [facility]. Increasing architectural distortion in the medial aspect of the left breast associated with multiple oil cysts, possibly due to previous surgery but does not correlate with the placement of the scar marker which was present on the lateral portion of the left breast on the exams from 2012 and 2013. Review of the mammographic report from 12/28/2010 states that the original mass was in the slightly outer, middle one third of the left breast. Recommend spot compression views, 90 degree true lateral view, and ultrasound for further evaluation. Please correlate with the patient regarding a possible surgical intervention at this site.   Left unilateral diagnostic mammogram with 3-D tomosynthesis with CAD and ultrasound left breast performed on 07/03/2018 at [facility]. Left breast 9 to 10:00 position and 9:00 position mass lesions as described above which are highly suggestive of malignancy. Biopsy is recommended to further evaluate   Surgical pathology performed on 07/09/2018 at [facility]. Left breast, 9 o'clock position, mammotome biopsy: fat necrosis with microcalcification. No evidence of neoplasm. 2. Left breast, 9:30 position, mammotome biopsy: fat necrosis with microcalcification. No  evidence of neoplasm.  07/13/2021-mammogram.  No mammographic evidence of malignancy.  BI-RADS Category 2: Benign findings.  Recommendation: Annual mammography.  2. Osteoporosis. On weekly Fosamax (with calcium + vitamin D) by PCP -  Friday.  a. Bone density, 06/09/2015 [place], [place]: CONCLUSION. Bilateral femoral neck osteopenia, fracture risk increased. Lumbar spine osteoporosis. Fracture risk high.   b. Bone Density performed on 06/20/2017 at [facility] was revealing for L1-L4 bone mineral density (BMD): 0.856 grams per square centimeter 1.7 standard deviations (T score) below the mean compared to a young normal. T score is -1.7. Mean of bilateral femoral necks, bone mineral density: 0.695 grams per square centimeter 1.4 standard deviations (T score) below the mean compared to a young normal. Lumbar spine: Osteopenia. Bilateral femoral neck: Osteopenia. T score is -1.4. IMPRESSION: 1. Bone mineral density measurements as above. 2. Lumbar spine: Osteopenia. 3. Bilateral femoral neck: Osteopenia.   c.  07/13/2021-DEXA scan.  Osteopenia.  FRAX 10-year fracture probability: Major osteoporotic fracture-18%; hip fracture-1.9%.  3. Hypertension. On Losartan and aldactone  4. Hyperlipidemia. On Lipitor  5. Possible irritable bowel. Quiescent.   6. Hypothyroidism.  Synthroid  7. Diabetes. Now on metformin and Trulicity.   8. Early rheumatoid arthritis, 2013. Quiescent.   9. Self-directed - refuses BCI and missed appointment last 03/2017.     PLAN:   1. Apprised of labs from 11/15/2021. Stable (normal) CBC, mild normoglycemia, normal bilirubin with otherwise stable CMP, and normal tumor markers (CA 27-29 = 13; from 11.7; 11.4; CEA = 0.74; from 1.1; 0.81).   2. Fax report of DEXA scan, 07/13/2021 to PCP.   3.  Re: The mammogram, 07/13/2021.  OLIVIA  4. Previously discussed the possible benefit of extending therapy with AIs for another 5 years (MA. 17R study showing 4% increased DFS), noting the  possible downsides of therapy (11% new onset osteoporosis, 14% bone fractures). Again discussed Breast Cancer Index study but ultimately patient refused.  5. Continue current medications.   6. Continue ongoing management per primary care physician and other specialists.  7. Mammograms per Gyn - Dr. Jordana Madrid ([place], [place]) - patient says they schedule - repeat scheduled for 07/2022  8. Obtain pre-office lab CBC with differential, CMP, CEA and CA27-29. At [facility] week before visit.   9. Return to the [place] office 12 months.    MEDICAL DECISION MAKING: Moderate Complexity   AMOUNT OF DATA: Moderate     I spent 30 total minutes, face-to-face, caring for Ryann today.  Greater than 50% of this time involved counseling and/or coordination of care as documented within this note regarding the patient's illness(es), pros and cons of various treatment options, instructions and/or risk reduction.    cc: Aries Pastrana MD ([facility])        Spring Godinez MD ([facility])

## 2021-11-22 ENCOUNTER — OFFICE VISIT (OUTPATIENT)
Dept: ONCOLOGY | Facility: CLINIC | Age: 66
End: 2021-11-22

## 2021-11-22 VITALS
TEMPERATURE: 97.2 F | HEIGHT: 63 IN | BODY MASS INDEX: 25.46 KG/M2 | DIASTOLIC BLOOD PRESSURE: 82 MMHG | WEIGHT: 143.7 LBS | RESPIRATION RATE: 16 BRPM | OXYGEN SATURATION: 98 % | SYSTOLIC BLOOD PRESSURE: 144 MMHG | HEART RATE: 72 BPM

## 2021-11-22 DIAGNOSIS — C50.212 MALIGNANT NEOPLASM OF UPPER-INNER QUADRANT OF LEFT BREAST IN FEMALE, ESTROGEN RECEPTOR POSITIVE (HCC): Primary | ICD-10-CM

## 2021-11-22 DIAGNOSIS — Z17.0 MALIGNANT NEOPLASM OF UPPER-INNER QUADRANT OF LEFT BREAST IN FEMALE, ESTROGEN RECEPTOR POSITIVE (HCC): Primary | ICD-10-CM

## 2021-11-22 PROCEDURE — 99214 OFFICE O/P EST MOD 30 MIN: CPT | Performed by: INTERNAL MEDICINE

## 2021-11-22 RX ORDER — PSEUDOEPHEDRINE HCL 30 MG
100 TABLET ORAL DAILY
COMMUNITY

## 2021-11-22 RX ORDER — MAGNESIUM OXIDE 400 MG/1
400 TABLET ORAL DAILY
COMMUNITY

## 2021-11-22 RX ORDER — CYANOCOBALAMIN 1000 UG/ML
1000 INJECTION, SOLUTION INTRAMUSCULAR; SUBCUTANEOUS ONCE
COMMUNITY

## 2022-03-21 ENCOUNTER — TELEPHONE (OUTPATIENT)
Dept: ONCOLOGY | Facility: CLINIC | Age: 67
End: 2022-03-21

## 2022-03-21 DIAGNOSIS — Z85.3 HISTORY OF BREAST CANCER: Primary | ICD-10-CM
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
## 2022-11-16 ENCOUNTER — TELEPHONE (OUTPATIENT)
Dept: ONCOLOGY | Facility: CLINIC | Age: 67
End: 2022-11-16

## 2022-11-16 DIAGNOSIS — Z85.3 HISTORY OF BREAST CANCER: Primary | ICD-10-CM

## 2022-11-16 NOTE — TELEPHONE ENCOUNTER
Caller: Ryann Stephen    Relationship: Self    Best call back number: [phone]    What is the best time to reach you: ASAP    Who are you requesting to speak with (clinical staff, provider,  specific staff member): DR. LONDONO'S NURSE    Do you know the name of the person who called:  RYANN    What was the call regarding: PATIENT IS GETTING LABS DONE FOR US & HER PCP, SHE DOES NOT WANT TO DUPLICATE & IS INQUIRING EXACTLY WHAT WE NEED AS HER PCP ORDERS THE FOLLOWING:    CBC W/ DIFF.  COMP. MET PANEL  LIPID PROFILE  GLYCOHEMOGLOBIN A1C  TSHR  VIT B12 (BLOOD)  VIT D25 HYDROXY      SHE BELIEVES THE ONLY ONE THAT WE MIGHT NEED THAT IS NOT INCLUDE IS THE TUMOR MARKER, [facility] & THEIR  FAX NUMBER IS  [phone].    Do you require a callback: PLEASE CALL TO CONFIRM & SEND FAX TO [employer] IF NEEDED PLEASE FAX NEW ORDER.
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
## 2022-11-30 ENCOUNTER — OFFICE VISIT (OUTPATIENT)
Dept: ONCOLOGY | Facility: CLINIC | Age: 67
End: 2022-11-30

## 2022-11-30 VITALS
RESPIRATION RATE: 18 BRPM | TEMPERATURE: 97.3 F | OXYGEN SATURATION: 97 % | HEART RATE: 87 BPM | WEIGHT: 145.6 LBS | DIASTOLIC BLOOD PRESSURE: 76 MMHG | BODY MASS INDEX: 25.8 KG/M2 | SYSTOLIC BLOOD PRESSURE: 118 MMHG | HEIGHT: 63 IN

## 2022-11-30 DIAGNOSIS — Z17.0 MALIGNANT NEOPLASM OF UPPER-INNER QUADRANT OF LEFT BREAST IN FEMALE, ESTROGEN RECEPTOR POSITIVE: Primary | ICD-10-CM

## 2022-11-30 DIAGNOSIS — C50.212 MALIGNANT NEOPLASM OF UPPER-INNER QUADRANT OF LEFT BREAST IN FEMALE, ESTROGEN RECEPTOR POSITIVE: Primary | ICD-10-CM

## 2022-11-30 PROCEDURE — 99214 OFFICE O/P EST MOD 30 MIN: CPT | Performed by: INTERNAL MEDICINE

## 2023-01-24 NOTE — TELEPHONE ENCOUNTER
Spoke with the patient regarding the appointment date and time for her additional images of her mammogram. She verbalized understanding.  
PAST MEDICAL HISTORY:  DM (diabetes mellitus)     HLD (hyperlipidemia)     HTN (hypertension)

## 2023-10-10 ENCOUNTER — TELEPHONE (OUTPATIENT)
Dept: ONCOLOGY | Facility: CLINIC | Age: 68
End: 2023-10-10

## 2023-10-10 NOTE — TELEPHONE ENCOUNTER
Caller: Ryann Stephen    Relationship to patient: Self    Best call back number: [phone]    Chief complaint: REQUEST TO RESCHEDULE     Type of visit: 1 YR FOLLOW UP 1    Requested date: EARLY JANUARY ANYTIME     If rescheduling, when is the original appointment: 11/30

## 2023-12-11 ENCOUNTER — TELEPHONE (OUTPATIENT)
Dept: ONCOLOGY | Facility: CLINIC | Age: 68
End: 2023-12-11
Payer: MEDICARE

## 2023-12-11 DIAGNOSIS — C50.212 MALIGNANT NEOPLASM OF UPPER-INNER QUADRANT OF LEFT BREAST IN FEMALE, ESTROGEN RECEPTOR POSITIVE: Primary | ICD-10-CM

## 2023-12-11 DIAGNOSIS — Z17.0 MALIGNANT NEOPLASM OF UPPER-INNER QUADRANT OF LEFT BREAST IN FEMALE, ESTROGEN RECEPTOR POSITIVE: Primary | ICD-10-CM

## 2023-12-11 NOTE — TELEPHONE ENCOUNTER
Caller: Ryann Stephen    Relationship: Self    Best call back number: [phone]    What is the best time to reach you: ANYTIME    Who are you requesting to speak with (clinical staff, provider,  specific staff member): CLINICAL    What was the call regarding: PT IS REQUESTING HER LAB ORDER FAXED OVER TO [facility]  FAX NUMBER [phone]

## 2023-12-19 NOTE — PROGRESS NOTES
MGW ONC [facility] GROUP HEMATOLOGY AND ONCOLOGY  [address]  [facility] [address] [phone]    Patient Name: Ryann Stephen  Encounter Date: 12/26/2023  YOB: 1955  Patient Number: [id number]      REASON FOR VISIT:  Ms. Ryann Stephen is a 68-year-old female who is seen in follow-up of stage IB (pT1b N1mic M0) ER 99%, OK 60%, HER-2/robert +1 (negative) carcinoma of the left breast. She is seen 155 months post diagnosis and 91 months since cessation of adjuvant Arimidex (completed 60 months of therapy). The patient is here alone (previously with her spouse, Miguel).      I have reviewed the HPI and verified with the patient the accuracy of it. No changes to interval history since the information was documented. Edward Barber MD 12/26/23      DIAGNOSTIC ABNORMALITIES:   Mammography on 12/28/2010 revealed fibroglandular densities in the breast parenchyma, numerous benign-appearing calcifications in each breast, and a questionable area of increasing nodular density in the outer middle third of the left breast. This area was associated with microcalcification and was an indeterminate finding. Further evaluation was advised.  Cone-down spot compression views on 01/11/2011 revealed a spiculated lesion in the left breast strongly suspicious for carcinoma. Biopsy was advised.  Stereotactic mammotome biopsy at [facility] in [place], [place] on 01/14/2011 showed moderately differentiated invasive ductal carcinoma with ductal carcinoma in situ. The findings are compatible with a primary breast cancer. The malignancy was characterized further as 99% ER positive, 60% OK positive, and HER-2/robert of +1.  Left breast needle localization biopsy (excisional) and node accession, 02/14/2011. She was found to have residual infiltrating ductal carcinoma, high grade. She was found to have a 1 x 0.7 x 0.7 cm high grade invasive ductal  carcinoma, Joya score = 8. Also noted were calcifications and benign breast elements, healing prior biopsy site, and no satellite lesions. One of the two accessed lymph nodes was positive with the largest deposit measuring 0.75 mm. Extranodal extension was identified. The tumor was staged as an AJCC pT1b, N1mic, MX, G3.  Further staging studies at [facility] on 03/08/2011 included a negative chest CT, brain CT, and nuclear bone scan. The multinodular goiter is noted.    PREVIOUS INTERVENTIONS:   Left breast needle localization biopsy (excisional) and node accession, 02/14/2011.  T/C (Taxotere and Cytoxan). From 03/10/2011 through 05/11/2011. Four cycles completed.  Adjuvant Arimidex. From 05/30/2011 through 05/30/2016  XRT to the left breast. From 06/21/2011 through 08/01/2011.        Problem List Items Addressed This Visit    None      Oncology/Hematology History Overview Note   DIAGNOSTIC ABNORMALITIES:  Mammography on 12/28/2010 revealed fibroglandular densities in the breast parenchyma, numerous benign-appearing calcifications in each breast, and a questionable area of increasing nodular density in the outer middle third of the left breast. This area was associated with microcalcification and was an indeterminate finding.  Further evaluation was advised.  Cone-down spot compression views on 01/11/2011 revealed a spiculated lesion in the left breast strongly suspicious for carcinoma.  Biopsy was advised.  Stereotactic mammotome biopsy at [facility] in [place], [place] on 01/14/2011 showed moderately differentiated invasive ductal carcinoma with ductal carcinoma in situ. The findings are compatible with a primary breast cancer. The malignancy was characterized further as 99% ER positive, 60% NH positive, and HER-2/robert of +1.  Left breast needle localization biopsy (excisional) and node accession, 02/14/2011. She was found to have residual infiltrating ductal carcinoma, high  grade. She was found to have a 1 x 0.7 x 0.7 cm high grade invasive ductal carcinoma, Eland score = 8. Also noted were calcifications and benign breast elements, healing prior biopsy site, and no satellite lesions. One of the two accessed lymph nodes was positive with the largest deposit measuring 0.75 mm. Extranodal extension was identified. The tumor was staged as an AJCC pT1b, N1mic, MX, G3.  Further staging studies at [facility] on 03/08/2011 included a negative chest CT, brain CT, and nuclear bone scan.  The multinodular goiter is noted.  PREVIOUS INTERVENTIONS:  Left breast needle localization biopsy (excisional) and node accession, 02/14/2011.  T/C (Taxotere and Cytoxan).  From 03/10/2011 through 05/11/2011. Four cycles completed.  Adjuvant Arimidex. From 05/30/2011 through 05/30/2016  XRT to the left breast. From 06/21/2011 through 08/01/2011.       Malignant neoplasm of upper-inner quadrant of left breast in female, estrogen receptor positive       PAST MEDICAL HISTORY:  ALLERGIES:  No Known Allergies  CURRENT MEDICATIONS:  Outpatient Encounter Medications as of 12/26/2023   Medication Sig Dispense Refill    atorvastatin (LIPITOR) 80 MG tablet TAKE 1 TABLET DAILY 90 tablet 3    calcium carbonate (OS-HO) 600 MG tablet Take 1 tablet by mouth Daily.      cyanocobalamin 1000 MCG/ML injection Inject 1 mL into the appropriate muscle as directed by prescriber 1 (One) Time. Every 2 weeks      dapagliflozin Propanediol 10 MG tablet Take  by mouth.      docusate sodium 100 MG capsule Take 1 capsule by mouth Daily.      Dulaglutide (Trulicity) 0.75 MG/0.5ML solution pen-injector Inject 0.75 mg under the skin into the appropriate area as directed.      fenofibrate 160 MG tablet Take 1 tablet by mouth Daily.      levothyroxine (SYNTHROID, LEVOTHROID) 50 MCG tablet Take 1 tablet by mouth.      losartan (COZAAR) 50 MG tablet Take 1 tablet by mouth Daily.      magnesium oxide (MAG-OX) 400 MG tablet Take  1 tablet by mouth Daily.      metFORMIN (GLUCOPHAGE) 1000 MG tablet Take 1 tablet by mouth.      Misc. Devices (Illusions C Breast Prosthesis) [facility] New breast prosthesis for the left breast 1 each 0    spironolactone (ALDACTONE) 50 MG tablet Take 1 tablet by mouth 2 (Two) Times a Day.       No facility-administered encounter medications on file as of 2023.   ADULT ILLNESSES:   Carcinoma of breast ( Stage Date: Unknown, Stage IB (T1b, pN1mi, M0)-Pathological  Date of Dx: ER Status: Positive; MD Status: Positive; HER-2/robert Status: Positive; Menopausal Status: Postmenopausal; Histopathologic Type: DCIS; Laterality: Left; ER-%: 99; MD-%: 60; HER-2/robert Value-IHC: 1+; Grade 2; ICD-10:D05.12 ;Intraductal carcinoma in situ of left breast )   Infiltrating ductal Ca of breast ( ICD-10:C50.512 ;Malignant neoplasm of lower-outer quadrant of left female breast   Diabetes mellitus type II ( type 2; ICD-10:E11.9 ;Type 2 diabetes mellitus without complications )   Glucose intolerance ( ICD-10:E74.39 ;Other disorders of intestinal carbohydrate absorption   Goiter ( ICD-10:E04.9 ;Nontoxic goiter, unspecified   Hyperlipidemia ( ICD-10:E78.5 ;Hyperlipidemia, unspecified   Hypertension ( ICD-10:I10 ;Essential (primary) hypertension   Hyponatremia ( ICD-10:E87.1 ;Hypo-osmolality and hyponatremia   Irritable bowel ( ICD-10:K58.9 ;Irritable bowel syndrome without diarrhea   Osteoporosis ( ICD-10:M81.0 ;Age-related osteoporosis without current pathological fracture  SURGERIES:    section, ()   Cholecystectomy   Extraction of impacted wisdom tooth   Tubal ligation    ADULT ILLNESSES:  Patient Active Problem List   Diagnosis Code    Essential hypertension I10    Osteoporosis M81.0    History of breast cancer Z85.3    Visit for gynecologic examination Z01.419    Screening for osteoporosis Z13.820    Malignant neoplasm of upper-inner quadrant of left breast in female, estrogen receptor positive C50.212, Z17.0     Hypercholesterolemia E78.00    History of mammogram Z92.89    History of colonoscopy Z98.890    Abnormal mammogram R92.8    Fat necrosis M79.89     SURGERIES:  Past Surgical History:   Procedure Laterality Date    BREAST BIOPSY      BREAST LUMPECTOMY Left 2011     SECTION      primary low transverse  section for cephalopelvic disproportion;  Last Performed:     CHOLECYSTECTOMY      TUBAL ABDOMINAL LIGATION      shantell laparoscopic bilateral tubal fulguration, undesired fertility;  Last Performed: 10/20/1994    WISDOM TOOTH EXTRACTION       wisdom teeth extraction x 4     HEALTH MAINTENANCE ITEMS:  Health Maintenance Due   Topic Date Due    BMI FOLLOWUP  Never done    COLORECTAL CANCER SCREENING  Never done    Pneumococcal Vaccine 65+ (1 - PCV) Never done    HEPATITIS C SCREENING  Never done    DIABETIC FOOT EXAM  Never done    DIABETIC EYE EXAM  Never done    URINE MICROALBUMIN  2020    INFLUENZA VACCINE  Never done    COVID-19 Vaccine (2023- season) 2023       <no information>  Last Completed Colonoscopy       This patient has no relevant Health Maintenance data.          Immunization History   Administered Date(s) Administered    COVID-19 (MODERNA) 1st,2nd,3rd Dose Monovalent 03/10/2021, 2021    COVID-19 (MODERNA) Monovalent Original Booster 2021     Last Completed Mammogram            MAMMOGRAM (Yearly) Order placed this encounter      2023  Mammo Screening Bilateral With CAD    10/27/2022  Mammo Screening Bilateral With CAD    2021  Mammo Screening Digital Tomosynthesis Bilateral With CAD    2020  Mammo Screening Digital Tomosynthesis Bilateral With CAD    2019  Mammo Screening Digital Tomosynthesis Bilateral With CAD    Only the first 5 history entries have been loaded, but more history exists.                      FAMILY HISTORY:  Family History   Problem Relation Age of Onset    Coronary artery disease Father     Stroke Father      "Hypertension Mother     Pancreatic cancer Mother     Diabetes Mother     Breast cancer Maternal Aunt     Cancer Maternal Grandmother         female    No Known Problems Maternal Grandfather     Liver cancer Paternal Grandmother     No Known Problems Paternal Grandfather      SOCIAL HISTORY:  Social History     Socioeconomic History    Marital status:    Tobacco Use    Smoking status: Never    Smokeless tobacco: Never   Substance and Sexual Activity    Alcohol use: No    Drug use: No    Sexual activity: Defer       REVIEW OF SYSTEMS:  Performance Status:   ECOG 0.  Constitutional:   The patient's appetite and energy are good. \"Doing fine\".  She remains active. \"Rebuilt our house from the [place].\" She has lost 3 lb (had gained 2 lb at her prior visit) since her last visit.  She has no fevers, chills, or drenching night sweats. Her sleep habits seem appropriate.    Ear/Nose/Mouth/Throat:   She reports no ear pains, sinus symptoms, sore throat, nosebleeds, or sore tongue. She reports no headaches. She reports no hoarseness, change in voice quality.   Ocular:   She reports no eye pain, significant change in visual acuity, double vision, or blurry vision.\"My prescription hasn't changed.\"  Respiratory:   She reports no chronic cough, significant shortness of breathing, phlegm production, or unexplained chest wall pain.  Cardiovascular:   She reports no exertional chest pain, chest pressure, or chest heaviness. She reports no claudication. She reports no palpitations or symptomatic orthostasis.  Gastrointestinal:   She reports no dysphagia, nausea, vomiting, postprandial abdominal pain, bloating, cramping, change in bowel habits, or discoloration of the stool. She reports no rectal bleeding. She reports no constipation or diarrhea.  Genitourinary:   She reports no recent UTIs.  No urinary burning, frequency, dribbling, or discoloration. She reports no need to urinate frequently through the night. She reports no " "difficulty controlling her bladder.  GYN:   She reports no unexplained vaginal bleeding and no significant vaginal discharge. Menopause age 44.  Breasts:   She reports no breast pain, nipple discharge, or bleeding. She reports no palpable breast masses. BSE is ongoing and unrevealing.  Musculoskeletal:   She has not had any arthralgias of the ankles and knees since she has been walking daily. She has no myalgias or nighttime leg cramping.  Extremities:   She reports no trouble with fluid retention or significant leg swelling.  Endocrine:   She reports no problems with excess thirst, excessive urination, vasomotor instability, or unexplained fatigue.  Heme/Lymphatic:   She reports no unexplained bleeding, bruising, petechial rash, or swollen glands.  Skin:   She reports no itching, rashes, or lesions which won't heal.  Neuro:   She reports no loss of consciousness, seizures, fainting spells, or dizziness. She reports no weakness of her face, arms, or legs. She reports no difficulty with speech. She has no tremors. She has no paresthesias.  Psych:   She reports no depression nor anxiety.       VITAL SIGNS: /68   Pulse 86   Temp 97.4 °F (36.3 °C) (Temporal)   Resp 18   Ht 157.5 cm (62\")   Wt 64.7 kg (142 lb 11.2 oz)   LMP  (LMP Unknown)   SpO2 96%   BMI 26.10 kg/m² Body surface area is 1.66 meters squared.  Pain Score    12/26/23 1440   PainSc: 0-No pain          PHYSICAL EXAMINATION:   General Exam:   She is a pleasant, cooperative, , well-developed, well-nourished, and modestly-kept female who is comfortable at rest. She arrived in the exam room ambulatory. She appears to be her stated age. Her skin color is normal. ECOG 0  Head/Neck:   The patient is anicteric and atraumatic. The trachea is midline. The neck is supple without evidence of jugular venous distention or cervical adenopathy.  Eyes:   The pupils are equal, round, and reactive to light. The extraocular movements are full. There is no scleral " jaundice or erythema.  Chest:   The respiratory efforts are normal and unhindered. The chest is clear to auscultation. There are no wheezes, rhonchi, rales, or asymmetry of breath sounds. The port in the right upper chest is well seated.  Cardiovascular:   The patient has a regular cardiac rate and rhythm without murmurs, rubs, or gallops. The peripheral pulses are equal and full.  Breast:   Chaperoned exam: Nikki Green MA-  The breasts are dense. The right breast is devoid of masses or axillary adenopathy.  Left breast is slightly smaller than the right.  Inspection of the left lumpectomy site reveals no erythema or worrisome nodularity to suggest tumor recurrence.  Extremities:   There is no evidence of cyanosis, clubbing, or edema.  Rheumatologic:   There is no overt evidence of rheumatoid deformities of the hands. There is no sausaging of the fingers. There is no sign of active synovitis. The gait is normal.  Cutaneous:   There are no overt rashes, disseminated lesions, purpura, or petechiae.  Lymphatics:   There is no evidence of adenopathy in the cervical, supraclavicular, or axillary areas.  Neurologic:   The patient is alert, oriented, cooperative, and pleasant. She is appropriately conversant. She ambulated into the exam room without assistance and transferred from chair to exam table unaided. There is no overt dysfunction of the motor, sensory, or cerebellar systems.  Psych:   Mood and affect are appropriate for circumstance. Eye contact is appropriate.        LABS    Lab Results - Last 18 Months   Lab Units 12/14/23  0823 09/13/23  0757 06/01/23  0722 02/23/23  1006 11/22/22  0846   SODIUM mmol/L 137 137 137 136 138   POTASSIUM mmol/L 4.9 4.8 4.6 4.6 4.3   TOTAL CO2 mmol/L 23 23 24 23 23   CHLORIDE mmol/L 102 103 103 101 103   ANION GAP mmol/L 12 11 10 12 12   CREATININE mg/dL 0.7 0.7 0.6* 0.6* 0.6*   BUN mg/dL 20 17 18 18 13   CALCIUM mg/dL 10.1 9.7 9.2 9.7 9.7   ALK PHOS U/L 37 43 37 41 41   ALT  (SGPT) U/L 22 19 17 16 15   AST (SGOT) U/L 23 21 16 19 16   BILIRUBIN mg/dL 0.62 0.56 0.48 0.58 0.61   ALBUMIN g/dL 4.9 4.7 4.4 4.6 4.7       Lab Results - Last 18 Months   Lab Units 12/14/23  0823 11/22/22  0830   CEA ng/mL 1.04 0.94     ASSESSMENT:   1. Carcinoma of the left breast, moderately differentiated infiltrating ductal carcinoma and ductal carcinoma in situ (DCIS). Tumor size 1 x 0.7 x 0.7 cm.   Baseline Stage: AJCC Stage IB (pT1b N1mic M0 G2).   Receptor Status: ER 99%, IA 60%, HER-2/robert at +1.   Baseline Node Status: 1 of 2 positive, mo.   Menopausal Status at Diagnosis: Postmenopausal (1999).   Tumor status: Mammogram performed on 06/28/2018 at [facility]. Increasing architectural distortion in the medial aspect of the left breast associated with multiple oil cysts, possibly due to previous surgery but does not correlate with the placement of the scar marker which was present on the lateral portion of the left breast on the exams from 2012 and 2013. Review of the mammographic report from 12/28/2010 states that the original mass was in the slightly outer, middle one third of the left breast. Recommend spot compression views, 90 degree true lateral view, and ultrasound for further evaluation. Please correlate with the patient regarding a possible surgical intervention at this site.   Left unilateral diagnostic mammogram with 3-D tomosynthesis with CAD and ultrasound left breast performed on 07/03/2018 at [facility]. Left breast 9 to 10:00 position and 9:00 position mass lesions as described above which are highly suggestive of malignancy. Biopsy is recommended to further evaluate   Surgical pathology performed on 07/09/2018 at [facility]. Left breast, 9 o'clock position, mammotome biopsy: fat necrosis with microcalcification. No evidence of neoplasm. 2. Left breast, 9:30 position, mammotome biopsy: fat necrosis with microcalcification. No evidence of neoplasm.  11/07/2023-mammogram.  BI-RADS  Category 2: Benign findings.  2. Osteoporosis. On weekly Fosamax (with calcium + vitamin D) by PCP -  Friday.  a. Bone density, 06/09/2015 [place], [place]: CONCLUSION. Bilateral femoral neck osteopenia, fracture risk increased. Lumbar spine osteoporosis. Fracture risk high.   b. Bone Density performed on 06/20/2017 at [facility] was revealing for L1-L4 bone mineral density (BMD): 0.856 grams per square centimeter 1.7 standard deviations (T score) below the mean compared to a young normal. T score is -1.7. Mean of bilateral femoral necks, bone mineral density: 0.695 grams per square centimeter 1.4 standard deviations (T score) below the mean compared to a young normal. Lumbar spine: Osteopenia. Bilateral femoral neck: Osteopenia. T score is -1.4. IMPRESSION: 1. Bone mineral density measurements as above. 2. Lumbar spine: Osteopenia. 3. Bilateral femoral neck: Osteopenia.   c.  07/13/2021-DEXA scan.  Osteopenia.  FRAX 10-year fracture probability: Major osteoporotic fracture-18%; hip fracture-1.9%.  3. Hypertension. On Losartan and aldactone  4. Hyperlipidemia. On Lipitor  5. Possible irritable bowel. Quiescent.   6. Hypothyroidism.  Synthroid  7. Diabetes. Now on metformin and Trulicity.   8. Early rheumatoid arthritis, 2013. Quiescent.   9. Self-directed - refuses BCI and missed appointment last 03/2017.     PLAN:   1. Apprised of labs from 12/14/23. Stable (normal) CBC, mild normoglycemia, normal bilirubin with otherwise normal CMP, CA 27-29 = 11.1 (prior: <9 - 13), CEA = 1.04 (prior:  0.81-1.1).     2.  Apprised of mammogram, 11/7/23 (above).  OLIVIA    3. Previously discussed the possible benefit of extending therapy with AIs for another 5 years (MA. 17R study showing 4% increased DFS), noting the possible downsides of therapy (11% new onset osteoporosis, 14% bone fractures). Again discussed Breast Cancer Index study but ultimately patient refused.  4. Continue current medications.   5.  Continue ongoing management per primary care physician and other specialists.  6. Mammograms per Gyn - MILLIE Harrington ([place], [place]) - patient says they schedule   7. Obtain pre-office lab CBC with differential, CMP, CEA and CA27-29. At [facility] or [facility] week before visit.   8. Return to the [place] office 12 months.    MEDICAL DECISION MAKING: Moderate Complexity   AMOUNT OF DATA: Moderate     I spent ~ 33 minutes caring for Ryann on this date of service. This time includes time spent by me in the following activities: preparing for the visit, reviewing tests, performing a medically appropriate examination and/or evaluation, counseling and educating the patient/family/caregiver, ordering medications, tests, or procedures and documenting information in the medical record      cc: Aries Pastrana MD ([facility])        Spring Godinez MD ([facility])

## 2023-12-26 ENCOUNTER — OFFICE VISIT (OUTPATIENT)
Dept: ONCOLOGY | Facility: CLINIC | Age: 68
End: 2023-12-26
Payer: MEDICARE

## 2023-12-26 VITALS
HEART RATE: 86 BPM | RESPIRATION RATE: 18 BRPM | SYSTOLIC BLOOD PRESSURE: 136 MMHG | WEIGHT: 142.7 LBS | TEMPERATURE: 97.4 F | HEIGHT: 62 IN | BODY MASS INDEX: 26.26 KG/M2 | DIASTOLIC BLOOD PRESSURE: 68 MMHG | OXYGEN SATURATION: 96 %

## 2023-12-26 DIAGNOSIS — C50.212 MALIGNANT NEOPLASM OF UPPER-INNER QUADRANT OF LEFT BREAST IN FEMALE, ESTROGEN RECEPTOR POSITIVE: Primary | ICD-10-CM

## 2023-12-26 DIAGNOSIS — Z12.31 ENCOUNTER FOR SCREENING MAMMOGRAM FOR MALIGNANT NEOPLASM OF BREAST: ICD-10-CM

## 2023-12-26 DIAGNOSIS — Z17.0 MALIGNANT NEOPLASM OF UPPER-INNER QUADRANT OF LEFT BREAST IN FEMALE, ESTROGEN RECEPTOR POSITIVE: Primary | ICD-10-CM

## 2024-12-06 ENCOUNTER — TELEPHONE (OUTPATIENT)
Dept: ONCOLOGY | Facility: CLINIC | Age: 69
End: 2024-12-06
Payer: MEDICARE

## 2024-12-06 NOTE — TELEPHONE ENCOUNTER
Caller: Ryann Stephen    Relationship: Self    Best call back number: [phone]    What is the best time to reach you: ANY    Who are you requesting to speak with (clinical staff, provider,  specific staff member): CLINICAL     What was the call regarding:   RYANN IS NEEDING HER LAB ORDERS FAXED TO [facility]         FAX# [phone]

## 2024-12-20 NOTE — PROGRESS NOTES
MGW ONC [facility] GROUP HEMATOLOGY AND ONCOLOGY  [address]  [facility] [address] [phone]    Patient Name: Ryann Stephen  Encounter Date: 12/26/2024  YOB: 1955  Patient Number: [id number]      REASON FOR VISIT:  Ms. Ryann Stephen is a 69-year-old female who is seen in follow-up of stage IB (pT1b N1mic M0) ER 99%, FL 60%, HER-2/robert +1 (negative) carcinoma of the left breast. She is seen 167 months post diagnosis and 103 months since cessation of adjuvant Arimidex (completed 60 months of therapy). The patient is here alone (previously with her spouse, Miguel).      I have reviewed the HPI and verified with the patient the accuracy of it. No changes to interval history since the information was documented. Edward Barber MD 12/26/24        DIAGNOSTIC ABNORMALITIES:   Mammography on 12/28/2010 revealed fibroglandular densities in the breast parenchyma, numerous benign-appearing calcifications in each breast, and a questionable area of increasing nodular density in the outer middle third of the left breast. This area was associated with microcalcification and was an indeterminate finding. Further evaluation was advised.  Cone-down spot compression views on 01/11/2011 revealed a spiculated lesion in the left breast strongly suspicious for carcinoma. Biopsy was advised.  Stereotactic mammotome biopsy at [facility] in [place], [place] on 01/14/2011 showed moderately differentiated invasive ductal carcinoma with ductal carcinoma in situ. The findings are compatible with a primary breast cancer. The malignancy was characterized further as 99% ER positive, 60% FL positive, and HER-2/robert of +1.  Left breast needle localization biopsy (excisional) and node accession, 02/14/2011. She was found to have residual infiltrating ductal carcinoma, high grade. She was found to have a 1 x 0.7 x 0.7 cm high grade invasive  ductal carcinoma, [place] score = 8. Also noted were calcifications and benign breast elements, healing prior biopsy site, and no satellite lesions. One of the two accessed lymph nodes was positive with the largest deposit measuring 0.75 mm. Extranodal extension was identified. The tumor was staged as an AJCC pT1b, N1mic, MX, G3.  Further staging studies at [facility] on 03/08/2011 included a negative chest CT, brain CT, and nuclear bone scan. The multinodular goiter is noted.    PREVIOUS INTERVENTIONS:   Left breast needle localization biopsy (excisional) and node accession, 02/14/2011.  T/C (Taxotere and Cytoxan). From 03/10/2011 through 05/11/2011. Four cycles completed.  Adjuvant Arimidex. From 05/30/2011 through 05/30/2016  XRT to the left breast. From 06/21/2011 through 08/01/2011.        Problem List Items Addressed This Visit          Other    Malignant neoplasm of upper-inner quadrant of left breast in female, estrogen receptor positive - Primary       Oncology/Hematology History Overview Note   DIAGNOSTIC ABNORMALITIES:  Mammography on 12/28/2010 revealed fibroglandular densities in the breast parenchyma, numerous benign-appearing calcifications in each breast, and a questionable area of increasing nodular density in the outer middle third of the left breast. This area was associated with microcalcification and was an indeterminate finding.  Further evaluation was advised.  Cone-down spot compression views on 01/11/2011 revealed a spiculated lesion in the left breast strongly suspicious for carcinoma.  Biopsy was advised.  Stereotactic mammotome biopsy at [facility] in [place], [place] on 01/14/2011 showed moderately differentiated invasive ductal carcinoma with ductal carcinoma in situ. The findings are compatible with a primary breast cancer. The malignancy was characterized further as 99% ER positive, 60% IN positive, and HER-2/robert of +1.  Left breast needle  localization biopsy (excisional) and node accession, 02/14/2011. She was found to have residual infiltrating ductal carcinoma, high grade. She was found to have a 1 x 0.7 x 0.7 cm high grade invasive ductal carcinoma, [place] score = 8. Also noted were calcifications and benign breast elements, healing prior biopsy site, and no satellite lesions. One of the two accessed lymph nodes was positive with the largest deposit measuring 0.75 mm. Extranodal extension was identified. The tumor was staged as an AJCC pT1b, N1mic, MX, G3.  Further staging studies at [facility] on 03/08/2011 included a negative chest CT, brain CT, and nuclear bone scan.  The multinodular goiter is noted.  PREVIOUS INTERVENTIONS:  Left breast needle localization biopsy (excisional) and node accession, 02/14/2011.  T/C (Taxotere and Cytoxan).  From 03/10/2011 through 05/11/2011. Four cycles completed.  Adjuvant Arimidex. From 05/30/2011 through 05/30/2016  XRT to the left breast. From 06/21/2011 through 08/01/2011.       Malignant neoplasm of upper-inner quadrant of left breast in female, estrogen receptor positive       PAST MEDICAL HISTORY:  ALLERGIES:  No Known Allergies  CURRENT MEDICATIONS:  Outpatient Encounter Medications as of 12/26/2024   Medication Sig Dispense Refill    atorvastatin (LIPITOR) 80 MG tablet TAKE 1 TABLET DAILY 90 tablet 3    calcium carbonate (OS-HO) 600 MG tablet Take 1 tablet by mouth Daily.      cyanocobalamin 1000 MCG/ML injection Inject 1 mL into the appropriate muscle as directed by prescriber 1 (One) Time. Every 2 weeks      dapagliflozin Propanediol 10 MG tablet Take  by mouth.      docusate sodium 100 MG capsule Take 1 capsule by mouth Daily.      Dulaglutide (Trulicity) 0.75 MG/0.5ML solution pen-injector Inject 0.75 mg under the skin into the appropriate area as directed.      fenofibrate 160 MG tablet Take 1 tablet by mouth Daily.      levothyroxine (SYNTHROID, LEVOTHROID) 50 MCG tablet Take 1  tablet by mouth.      losartan (COZAAR) 50 MG tablet Take 1 tablet by mouth Daily.      magnesium oxide (MAG-OX) 400 MG tablet Take 1 tablet by mouth Daily.      metFORMIN (GLUCOPHAGE) 1000 MG tablet Take 1 tablet by mouth.      Misc. Devices (Illusions C Breast Prosthesis) [facility] New breast prosthesis for the left breast 1 each 0    spironolactone (ALDACTONE) 50 MG tablet Take 1 tablet by mouth 2 (Two) Times a Day.       No facility-administered encounter medications on file as of 2024.   ADULT ILLNESSES:   Carcinoma of breast ( Stage Date: Unknown, Stage IB (T1b, pN1mi, M0)-Pathological  Date of Dx: ER Status: Positive; AR Status: Positive; HER-2/robert Status: Positive; Menopausal Status: Postmenopausal; Histopathologic Type: DCIS; Laterality: Left; ER-%: 99; AR-%: 60; HER-2/robert Value-IHC: 1+; Grade 2; ICD-10:D05.12 ;Intraductal carcinoma in situ of left breast )   Infiltrating ductal Ca of breast ( ICD-10:C50.512 ;Malignant neoplasm of lower-outer quadrant of left female breast   Diabetes mellitus type II ( type 2; ICD-10:E11.9 ;Type 2 diabetes mellitus without complications )   Glucose intolerance ( ICD-10:E74.39 ;Other disorders of intestinal carbohydrate absorption   Goiter ( ICD-10:E04.9 ;Nontoxic goiter, unspecified   Hyperlipidemia ( ICD-10:E78.5 ;Hyperlipidemia, unspecified   Hypertension ( ICD-10:I10 ;Essential (primary) hypertension   Hyponatremia ( ICD-10:E87.1 ;Hypo-osmolality and hyponatremia   Irritable bowel ( ICD-10:K58.9 ;Irritable bowel syndrome without diarrhea   Osteoporosis ( ICD-10:M81.0 ;Age-related osteoporosis without current pathological fracture  SURGERIES:    section, ()   Cholecystectomy   Extraction of impacted wisdom tooth   Tubal ligation    ADULT ILLNESSES:  Patient Active Problem List   Diagnosis Code    Essential hypertension I10    Osteoporosis M81.0    History of breast cancer Z85.3    Visit for gynecologic examination Z01.419    Screening for  osteoporosis Z13.820    Malignant neoplasm of upper-inner quadrant of left breast in female, estrogen receptor positive C50.212, Z17.0    Hypercholesterolemia E78.00    History of mammogram Z92.89    History of colonoscopy Z98.890    Abnormal mammogram R92.8    Fat necrosis M79.89     SURGERIES:  Past Surgical History:   Procedure Laterality Date    BREAST BIOPSY      BREAST LUMPECTOMY Left 2011     SECTION      primary low transverse  section for cephalopelvic disproportion;  Last Performed:     CHOLECYSTECTOMY      TUBAL ABDOMINAL LIGATION      shantell laparoscopic bilateral tubal fulguration, undesired fertility;  Last Performed: 10/20/1994    WISDOM TOOTH EXTRACTION       wisdom teeth extraction x 4     HEALTH MAINTENANCE ITEMS:  Health Maintenance Due   Topic Date Due    BMI FOLLOWUP  Never done    HEPATITIS C SCREENING  Never done    Pneumococcal Vaccine 65+ (1 of  - PCV) Never done    INFLUENZA VACCINE  Never done    COVID-19 Vaccine (2024- season) 2024       <no information>  Last Completed Colonoscopy            COLORECTAL CANCER SCREENING (COLONOSCOPY - Every 10 Years) Tentatively due on 2024  Outside Procedure: COLONOSCOPY                  Immunization History   Administered Date(s) Administered    COVID-19 (MODERNA) 1st,2nd,3rd Dose Monovalent 03/10/2021, 2021    COVID-19 (MODERNA) Monovalent Original Booster 2021     Last Completed Mammogram            MAMMOGRAM (Every 2 Years) Next due on 2026  Mammo Screening Bilateral With CAD    2023  MAMMO SCREENING BILATERAL W CAD    10/27/2022  MAMMO SCREENING BILATERAL W CAD    2021  Mammo Screening Digital Tomosynthesis Bilateral With CAD    2020  Mammo Screening Digital Tomosynthesis Bilateral With CAD    Only the first 5 history entries have been loaded, but more history exists.                      FAMILY HISTORY:  Family History   Problem Relation  "Age of Onset    Coronary artery disease Father     Stroke Father     Hypertension Mother     Pancreatic cancer Mother     Diabetes Mother     Breast cancer Maternal Aunt     Cancer Maternal Grandmother         female    No Known Problems Maternal Grandfather     Liver cancer Paternal Grandmother     No Known Problems Paternal Grandfather      SOCIAL HISTORY:  Social History     Socioeconomic History    Marital status:    Tobacco Use    Smoking status: Never    Smokeless tobacco: Never   Substance and Sexual Activity    Alcohol use: No    Drug use: No    Sexual activity: Defer       REVIEW OF SYSTEMS:  Performance Status:   ECOG 0.  Constitutional:   The patient's appetite and energy are good. \"Doing just fine\".  She remains active. She has regained 4 lb (had lost 3 lb at her prior visit) since her last visit.  She has no fevers, chills, or drenching night sweats. Her sleep habits seem appropriate.    Ear/Nose/Mouth/Throat:   She reports no ear pains, sinus symptoms, sore throat, nosebleeds, or sore tongue. She reports no headaches. She reports no hoarseness, change in voice quality.   Ocular:   She reports no eye pain, significant change in visual acuity, double vision, or blurry vision.\"My vision prescription hasn't changed.\"  Respiratory:   She reports no chronic cough, significant shortness of breathing, phlegm production, or unexplained chest wall pain.  Cardiovascular:   She reports no exertional chest pain, chest pressure, or chest heaviness. She reports no claudication. She reports no palpitations or symptomatic orthostasis.  Gastrointestinal:   She reports no dysphagia, nausea, vomiting, postprandial abdominal pain, bloating, cramping, change in bowel habits, or discoloration of the stool. She reports no rectal bleeding. She reports no constipation or diarrhea.  Genitourinary:   She reports no recent UTIs.  No urinary burning, frequency, dribbling, or discoloration. She reports no need to urinate " "frequently through the night. She reports no difficulty controlling her bladder.  GYN:   She reports no unexplained vaginal bleeding and no significant vaginal discharge. Menopause age 44.  Breasts:   She reports no breast pain, nipple discharge, or bleeding. She reports no palpable breast masses. BSE is ongoing and unrevealing.  Musculoskeletal:   She has not had any arthralgias of the ankles and knees since she has been walking daily. She has no myalgias or nighttime leg cramping.  Extremities:   She reports no trouble with fluid retention or significant leg swelling.  Endocrine:   She reports no problems with excess thirst, excessive urination, vasomotor instability, or unexplained fatigue.  Heme/Lymphatic:   She reports no unexplained bleeding, bruising, petechial rash, or swollen glands.  Skin:   She reports no itching, rashes, or lesions which won't heal.  Neuro:   She reports no loss of consciousness, seizures, fainting spells, or dizziness. She reports no weakness of her face, arms, or legs. She reports no difficulty with speech. She has no tremors. She has no paresthesias.  Psych:   She reports no depression nor anxiety.       VITAL SIGNS: /60   Pulse 87   Temp 97.6 °F (36.4 °C) (Temporal)   Resp 18   Ht 160 cm (63\")   Wt 66.2 kg (146 lb)   LMP  (LMP Unknown)   SpO2 96%   BMI 25.86 kg/m² Body surface area is 1.69 meters squared.  Pain Score    12/26/24 1455   PainSc: 0-No pain            PHYSICAL EXAMINATION:   General Exam:   She is a pleasant, cooperative, , well-developed, well-nourished, and modestly-kept female who is comfortable at rest. She arrived in the exam room ambulatory. She appears to be her stated age. Her skin color is normal. ECOG 0  Head/Neck:   The patient is anicteric and atraumatic. The trachea is midline. The neck is supple without evidence of jugular venous distention or cervical adenopathy.  Eyes:   The pupils are equal, round, and reactive to light. The extraocular " movements are full. There is no scleral jaundice or erythema.  Chest:   The respiratory efforts are normal and unhindered. The chest is clear to auscultation. There are no wheezes, rhonchi, rales, or asymmetry of breath sounds. The port in the right upper chest has been removed and the site has healed with some scar thickening.  Cardiovascular:   The patient has a regular cardiac rate and rhythm without murmurs, rubs, or gallops.   Breast:   Chaperoned exam: Nikki Green MA-  The breasts are dense. The right breast is devoid of masses or axillary adenopathy.  Left breast is slightly smaller than the right.  Inspection of the left lumpectomy site reveals some scar thickness but no erythema or worrisome nodularity to suggest tumor recurrence.  Extremities:   There is no evidence of cyanosis, clubbing, or edema.  Rheumatologic:   There is no overt evidence of rheumatoid deformities of the hands. There is no sausaging of the fingers. There is no sign of active synovitis. The gait is normal.  Cutaneous:   There are no overt rashes, disseminated lesions, purpura, or petechiae.  Lymphatics:   There is no evidence of adenopathy in the cervical, supraclavicular, or axillary areas.  Neurologic:   The patient is alert, oriented, cooperative, and pleasant. She is appropriately conversant. She ambulated into the exam room without assistance and transferred from chair to exam table unaided. There is no overt dysfunction of the motor, sensory, or cerebellar systems.  Psych:   Mood and affect are appropriate for circumstance. Eye contact is appropriate.        LABS    Lab Results - Last 18 Months   Lab Units 12/16/24  0748 10/18/24  0857 03/21/24  0841 12/14/23  0823 09/13/23  0757   SODIUM mmol/L 136 136 134* 137 137   POTASSIUM mmol/L 5.1 5 5 4.9 4.8   TOTAL CO2 mmol/L 25 25 25 23 23   CHLORIDE mmol/L 102 101 98 102 103   ANION GAP mmol/L 9 10 11 12 11   CREATININE mg/dL 0.7 0.6* 0.6* 0.7 0.7   BUN mg/dL 16 19 19 20 17    CALCIUM mg/dL 9.2 9.5 9.7 10.1 9.7   ALK PHOS U/L 36 41 40 37 43   ALT (SGPT) U/L 18 17 18 22 19   AST (SGOT) U/L 21 16 18 23 21   BILIRUBIN mg/dL 0.44 0.69 0.56 0.62 0.56   ALBUMIN g/dL 4.4 4.7 4.1 4.9 4.7       Lab Results - Last 18 Months   Lab Units 12/16/24  0748 12/14/23  0823   CEA ng/mL 1.29 1.04     ASSESSMENT:   1. Carcinoma of the left breast, moderately differentiated infiltrating ductal carcinoma and ductal carcinoma in situ (DCIS). Tumor size 1 x 0.7 x 0.7 cm.   Baseline Stage: AJCC Stage IB (pT1b N1mic M0 G2).   Receptor Status: ER 99%, FL 60%, HER-2/robert at +1.   Baseline Node Status: 1 of 2 positive, mo.   Menopausal Status at Diagnosis: Postmenopausal (1999).   Tumor status: Mammogram performed on 06/28/2018 at [facility]. Increasing architectural distortion in the medial aspect of the left breast associated with multiple oil cysts, possibly due to previous surgery but does not correlate with the placement of the scar marker which was present on the lateral portion of the left breast on the exams from 2012 and 2013. Review of the mammographic report from 12/28/2010 states that the original mass was in the slightly outer, middle one third of the left breast. Recommend spot compression views, 90 degree true lateral view, and ultrasound for further evaluation. Please correlate with the patient regarding a possible surgical intervention at this site.   Left unilateral diagnostic mammogram with 3-D tomosynthesis with CAD and ultrasound left breast performed on 07/03/2018 at [facility]. Left breast 9 to 10:00 position and 9:00 position mass lesions as described above which are highly suggestive of malignancy. Biopsy is recommended to further evaluate   Surgical pathology performed on 07/09/2018 at [facility]. Left breast, 9 o'clock position, mammotome biopsy: fat necrosis with microcalcification. No evidence of neoplasm. 2. Left breast, 9:30 position, mammotome biopsy: fat necrosis with  microcalcification. No evidence of neoplasm.  11/13/2024-mammogram.  BI-RADS Category 2: Benign findings.  2. Osteoporosis. On weekly Fosamax (with calcium + vitamin D) by PCP -  Friday.  a. Bone density, 06/09/2015 [place], [place]: CONCLUSION. Bilateral femoral neck osteopenia, fracture risk increased. Lumbar spine osteoporosis. Fracture risk high.   b. Bone Density performed on 06/20/2017 at [facility] was revealing for L1-L4 bone mineral density (BMD): 0.856 grams per square centimeter 1.7 standard deviations (T score) below the mean compared to a young normal. T score is -1.7. Mean of bilateral femoral necks, bone mineral density: 0.695 grams per square centimeter 1.4 standard deviations (T score) below the mean compared to a young normal. Lumbar spine: Osteopenia. Bilateral femoral neck: Osteopenia. T score is -1.4. IMPRESSION: 1. Bone mineral density measurements as above. 2. Lumbar spine: Osteopenia. 3. Bilateral femoral neck: Osteopenia.   c.  07/13/2021-DEXA scan.  Osteopenia.  FRAX 10-year fracture probability: Major osteoporotic fracture-18%; hip fracture-1.9%.  3. Hypertension. On Losartan and aldactone  4. Hyperlipidemia. On Lipitor  5. Possible irritable bowel. Quiescent.   6. Hypothyroidism.  Synthroid  7. Diabetes.  Per PCP.  On metformin and Trulicity  8. Early rheumatoid arthritis, 2013. Quiescent.       PLAN:   1. Apprised of labs from 12/16/24. Stable (normal) CBC, glucose 128, normal bilirubin with otherwise normal CMP, CA 27-29 = 14 (prior: <9 - 13), CEA = 1.29 (prior:  0.81-1.1).     2.  Review mammogram, 11/13/2024 (above).  BI-RADS Category 2.  Benign.    3. Previously discussed the possible benefit of extending therapy with AIs for another 5 years (MA. 17R study showing 4% increased DFS), noting the possible downsides of therapy (11% new onset osteoporosis, 14% bone fractures). Again discussed Breast Cancer Index study but ultimately patient refused.  4. Continue  current medications.   5. Continue ongoing management per primary care physician and other specialists.  6. Mammograms per Gyn - MILLIE Harrington ([facility], [place]) - patient says they schedule   7. Obtain pre-office lab CBC with differential, CMP, CEA and CA27-29. At [facility] or [facility] week before visit.   8. Return to the [place] office 12 months.    MEDICAL DECISION MAKING: Moderate Complexity   AMOUNT OF DATA: Moderate     I spent ~30 minutes caring for Ryann on this date of service. This time includes time spent by me in the following activities: preparing for the visit, reviewing tests, performing a medically appropriate examination and/or evaluation, counseling and educating the patient/family/caregiver, ordering medications, tests, or procedures and documenting information in the medical record      cc: Aries Pastrana MD ([facility])        Spring Godinez MD ([facility])

## 2024-12-26 ENCOUNTER — OFFICE VISIT (OUTPATIENT)
Dept: ONCOLOGY | Facility: CLINIC | Age: 69
End: 2024-12-26
Payer: MEDICARE

## 2024-12-26 VITALS
SYSTOLIC BLOOD PRESSURE: 122 MMHG | HEIGHT: 63 IN | OXYGEN SATURATION: 96 % | TEMPERATURE: 97.6 F | DIASTOLIC BLOOD PRESSURE: 60 MMHG | WEIGHT: 146 LBS | BODY MASS INDEX: 25.87 KG/M2 | RESPIRATION RATE: 18 BRPM | HEART RATE: 87 BPM

## 2024-12-26 DIAGNOSIS — C50.212 MALIGNANT NEOPLASM OF UPPER-INNER QUADRANT OF LEFT BREAST IN FEMALE, ESTROGEN RECEPTOR POSITIVE: Primary | ICD-10-CM

## 2024-12-26 DIAGNOSIS — Z17.0 MALIGNANT NEOPLASM OF UPPER-INNER QUADRANT OF LEFT BREAST IN FEMALE, ESTROGEN RECEPTOR POSITIVE: Primary | ICD-10-CM
